# Patient Record
Sex: MALE | Race: WHITE | Employment: FULL TIME | URBAN - NONMETROPOLITAN AREA
[De-identification: names, ages, dates, MRNs, and addresses within clinical notes are randomized per-mention and may not be internally consistent; named-entity substitution may affect disease eponyms.]

---

## 2017-12-04 ENCOUNTER — OFFICE VISIT (OUTPATIENT)
Dept: PRIMARY CARE CLINIC | Age: 22
End: 2017-12-04
Payer: COMMERCIAL

## 2017-12-04 VITALS
OXYGEN SATURATION: 99 % | WEIGHT: 186 LBS | TEMPERATURE: 97.8 F | SYSTOLIC BLOOD PRESSURE: 120 MMHG | DIASTOLIC BLOOD PRESSURE: 80 MMHG | HEIGHT: 72 IN | RESPIRATION RATE: 16 BRPM | BODY MASS INDEX: 25.19 KG/M2 | HEART RATE: 64 BPM

## 2017-12-04 DIAGNOSIS — J01.00 ACUTE NON-RECURRENT MAXILLARY SINUSITIS: Primary | ICD-10-CM

## 2017-12-04 PROCEDURE — 96372 THER/PROPH/DIAG INJ SC/IM: CPT | Performed by: NURSE PRACTITIONER

## 2017-12-04 PROCEDURE — 99213 OFFICE O/P EST LOW 20 MIN: CPT | Performed by: NURSE PRACTITIONER

## 2017-12-04 RX ORDER — AZELASTINE 1 MG/ML
1 SPRAY, METERED NASAL 2 TIMES DAILY
Qty: 1 BOTTLE | Refills: 3 | Status: SHIPPED | OUTPATIENT
Start: 2017-12-04 | End: 2018-11-01

## 2017-12-04 RX ORDER — DOXYCYCLINE HYCLATE 100 MG/1
100 CAPSULE ORAL 2 TIMES DAILY
Qty: 20 CAPSULE | Refills: 0 | Status: SHIPPED | OUTPATIENT
Start: 2017-12-04 | End: 2020-04-30 | Stop reason: SDUPTHER

## 2017-12-04 RX ORDER — TRIAMCINOLONE ACETONIDE 40 MG/ML
40 INJECTION, SUSPENSION INTRA-ARTICULAR; INTRAMUSCULAR ONCE
Status: COMPLETED | OUTPATIENT
Start: 2017-12-04 | End: 2017-12-04

## 2017-12-04 RX ORDER — PREDNISONE 1 MG/1
TABLET ORAL
Qty: 18 TABLET | Refills: 0 | Status: SHIPPED | OUTPATIENT
Start: 2017-12-04 | End: 2018-11-01

## 2017-12-04 RX ADMIN — TRIAMCINOLONE ACETONIDE 40 MG: 40 INJECTION, SUSPENSION INTRA-ARTICULAR; INTRAMUSCULAR at 11:59

## 2017-12-04 ASSESSMENT — ENCOUNTER SYMPTOMS
SINUS PRESSURE: 1
NAUSEA: 1

## 2017-12-04 NOTE — PROGRESS NOTES
[address] [address] [place]  [id number]  Dept: [phone]  Dept Fax: [phone]  Loc: [phone]    Kiera Flores is a 23 y.o. male who presents today for his medical conditions/complaints as noted below. Keira Flores is c/o of Sinusitis (patient is here today because he has been experiencing nausea and vomiting, problems with his sinuses, head pressure and cough. Patient states that he has been feeling bad for about 3 weeks. )        HPI:     HPI   Chief Complaint   Patient presents with    Sinusitis     patient is here today because he has been experiencing nausea and vomiting, problems with his sinuses, head pressure and cough. Patient states that he has been feeling bad for about 3 weeks. been using otc cold meds. Fever low grade. The nausea came in the morning when he had a lot of sinus drainage. He was having problems focusing on the computer so he went and had his eyes checked and they were normal.  Vitals 12/4/2017 9/72/6994   SYSTOLIC [phone]   DIASTOLIC 80 83   Site Right Arm    Position Sitting    Cuff Size Medium Adult    Pulse 64 72   Temp 97.8 98.6   Resp 16 16   Weight 186 lb 184 lb 4.8 oz   Height 6' 0\" 6' 0\"     History reviewed. No pertinent past medical history. Past Surgical History:   Procedure Laterality Date    BONE GRAFT      BONE GRAFT      Dr Nedra Vazquez Left        History reviewed. No pertinent family history. Social History   Substance Use Topics    Smoking status: Never Smoker    Smokeless tobacco: Never Used    Alcohol use No      Current Outpatient Prescriptions   Medication Sig Dispense Refill    predniSONE (DELTASONE) 5 MG tablet Days 1-3 take 1 PO TID, days 4-6 take 1 PO BID, days 7-9 take 1 PO daily.  Start on Tue 18 tablet 0    doxycycline hyclate (VIBRAMYCIN) 100 MG capsule Take 1 capsule by mouth 2 times daily for 10 days 20 capsule 0    azelastine (ASTELIN) 0.1 % nasal spray 1 spray by Nasal route 2 times daily Use in each nostril as directed 1 Bottle 3     No current facility-administered medications for this visit. Allergies   Allergen Reactions    Cefprozil Hives       Health Maintenance   Topic Date Due    HIV screen  06/22/2008    DTaP/Tdap/Td vaccine (1 - Tdap) 06/22/2012    Flu vaccine (1) 09/01/2017       Subjective:      Review of Systems   Constitutional: Positive for fever. HENT: Positive for ear pain and sinus pressure. Gastrointestinal: Positive for nausea. Psychiatric/Behavioral: Negative. Objective:     Physical Exam   Constitutional: He is oriented to person, place, and time. He appears well-developed and well-nourished. HENT:   Head: Normocephalic. Right Ear: Tympanic membrane is not erythematous and not bulging. A middle ear effusion is present. Left Ear: Tympanic membrane is not erythematous and not bulging. A middle ear effusion is present. Nose: Right sinus exhibits maxillary sinus tenderness. Left sinus exhibits maxillary sinus tenderness. Mouth/Throat: Uvula is midline and oropharynx is clear and moist.   Cardiovascular: Normal rate, regular rhythm and normal heart sounds. Pulmonary/Chest: Effort normal and breath sounds normal.   Neurological: He is alert and oriented to person, place, and time. Skin: Skin is warm and dry. Psychiatric: He has a normal mood and affect. His behavior is normal. Judgment and thought content normal.   Nursing note and vitals reviewed. /80 (Site: Right Arm, Position: Sitting, Cuff Size: Medium Adult)   Pulse 64   Temp 97.8 °F (36.6 °C) (Temporal)   Resp 16   Ht 6' (1.829 m)   Wt 186 lb (84.4 kg)   SpO2 99%   BMI 25.23 kg/m²     Assessment:      1. Acute non-recurrent maxillary sinusitis         Plan:        Patient given educational materials - see patient instructions. Discussed use, benefit, and side effects of prescribed medications. All patient questions answered.   Pt voiced

## 2018-11-01 ENCOUNTER — OFFICE VISIT (OUTPATIENT)
Dept: PRIMARY CARE CLINIC | Age: 23
End: 2018-11-01
Payer: COMMERCIAL

## 2018-11-01 VITALS
OXYGEN SATURATION: 98 % | RESPIRATION RATE: 16 BRPM | WEIGHT: 200 LBS | HEART RATE: 72 BPM | BODY MASS INDEX: 27.09 KG/M2 | DIASTOLIC BLOOD PRESSURE: 84 MMHG | HEIGHT: 72 IN | TEMPERATURE: 97.1 F | SYSTOLIC BLOOD PRESSURE: 140 MMHG

## 2018-11-01 DIAGNOSIS — Z23 NEED FOR TDAP VACCINATION: ICD-10-CM

## 2018-11-01 DIAGNOSIS — J34.2 DEVIATED SEPTUM: Primary | ICD-10-CM

## 2018-11-01 PROCEDURE — 99213 OFFICE O/P EST LOW 20 MIN: CPT | Performed by: NURSE PRACTITIONER

## 2018-11-01 PROCEDURE — 90715 TDAP VACCINE 7 YRS/> IM: CPT | Performed by: NURSE PRACTITIONER

## 2018-11-01 PROCEDURE — 90471 IMMUNIZATION ADMIN: CPT | Performed by: NURSE PRACTITIONER

## 2018-11-01 RX ORDER — FEXOFENADINE HCL AND PSEUDOEPHEDRINE HCI 180; 240 MG/1; MG/1
1 TABLET, EXTENDED RELEASE ORAL DAILY
Qty: 30 TABLET | Refills: 3 | Status: SHIPPED | OUTPATIENT
Start: 2018-11-01 | End: 2020-04-30 | Stop reason: SDUPTHER

## 2018-11-02 ASSESSMENT — ENCOUNTER SYMPTOMS
SORE THROAT: 1
SINUS PRESSURE: 1

## 2019-06-27 ENCOUNTER — HOSPITAL ENCOUNTER (OUTPATIENT)
Dept: NON INVASIVE DIAGNOSTICS | Age: 24
Discharge: HOME | End: 2019-06-27

## 2019-06-27 LAB
LV EF: 50 %
LVEF MODALITY: NORMAL

## 2019-06-27 PROCEDURE — 93350 STRESS TTE ONLY: CPT

## 2019-09-09 ENCOUNTER — OFFICE VISIT (OUTPATIENT)
Dept: INTERNAL MEDICINE | Facility: CLINIC | Age: 24
End: 2019-09-09

## 2019-09-09 ENCOUNTER — TELEPHONE (OUTPATIENT)
Dept: PRIMARY CARE CLINIC | Age: 24
End: 2019-09-09

## 2019-09-09 VITALS
TEMPERATURE: 98.1 F | RESPIRATION RATE: 18 BRPM | BODY MASS INDEX: 26.3 KG/M2 | WEIGHT: 194.2 LBS | SYSTOLIC BLOOD PRESSURE: 121 MMHG | OXYGEN SATURATION: 100 % | HEART RATE: 90 BPM | HEIGHT: 72 IN | DIASTOLIC BLOOD PRESSURE: 76 MMHG

## 2019-09-09 DIAGNOSIS — A09 GASTROINTESTINAL INFECTION: Primary | ICD-10-CM

## 2019-09-09 PROCEDURE — 99213 OFFICE O/P EST LOW 20 MIN: CPT | Performed by: NURSE PRACTITIONER

## 2019-09-09 RX ORDER — METRONIDAZOLE 250 MG/1
250 TABLET ORAL 3 TIMES DAILY
Qty: 21 TABLET | Refills: 0 | Status: SHIPPED | OUTPATIENT
Start: 2019-09-09

## 2019-09-09 NOTE — PROGRESS NOTES
CC: diarrhea and fever    History:  Chavo Potts is a 24 y.o. male who presents today for evaluation of the above problems.      Patient reports that he just returned yesterday from [employer] in [place].  He was [place] in the mountains during this trip.  1 of the individuals with him is currently hospitalized for Giardia infection.  He reports that he was not drinking the same water as this individual, however, person did cook for him at times.  Chavo reports that he developed a fever yesterday up to 103 which has been relieved by Tylenol.  Diarrhea started by last night.  He states he has been well over 5-10 times already today.  Denies any nausea.  Denies any blood in his stool.      Diarrhea    This is a new problem. The current episode started yesterday. The problem occurs 5 to 10 times per day. The problem has been gradually worsening. Associated symptoms include abdominal pain and a fever. Pertinent negatives include no vomiting. Nothing aggravates the symptoms. Risk factors include ill contacts and suspect food intake. He has tried analgesics for the symptoms.   Fever    Associated symptoms include abdominal pain and diarrhea. Pertinent negatives include no vomiting.     ROS:  Review of Systems   Constitutional: Positive for fever.   Gastrointestinal: Positive for abdominal pain and diarrhea. Negative for vomiting.       Allergies   Allergen Reactions   • Cefprozil Hives     History reviewed. No pertinent past medical history.  Past Surgical History:   Procedure Laterality Date   • BONE GRAFT     • KNEE SURGERY       Family History   Problem Relation Age of Onset   • Cancer Father       reports that he has never smoked. He has never used smokeless tobacco. He reports that he does not drink alcohol or use drugs.      Current Outpatient Medications:   •  metroNIDAZOLE (FLAGYL) 250 MG tablet, Take 1 tablet by mouth 3 (Three) Times a Day., Disp: 21 tablet, Rfl: 0    OBJECTIVE:  /76 (BP Location: Left arm,  "Patient Position: Sitting, Cuff Size: Adult)   Pulse 90   Temp 98.1 °F (36.7 °C) (Oral)   Resp 18   Ht 182.9 cm (72\")   Wt 88.1 kg (194 lb 3.2 oz)   SpO2 100%   BMI 26.34 kg/m²    Physical Exam   Constitutional: He is oriented to person, place, and time. Vital signs are normal. He appears well-developed and well-nourished.   Cardiovascular: Normal rate and regular rhythm.   Pulmonary/Chest: Effort normal and breath sounds normal.   Abdominal: Soft. Bowel sounds are normal. He exhibits no distension. There is no tenderness.   Neurological: He is alert and oriented to person, place, and time.   Psychiatric: He has a normal mood and affect. His behavior is normal.   Vitals reviewed.      Assessment/Plan    Chavo was seen today for diarrhea and fever.    Diagnoses and all orders for this visit:    Gastrointestinal infection  -     metroNIDAZOLE (FLAGYL) 250 MG tablet; Take 1 tablet by mouth 3 (Three) Times a Day.      This patient has high risk for Giardia infection.  We will go ahead and treat with Flagyl.  Advised patient to maintain good hydration.  He should not attempt to stop diarrhea with Imodium at this time.  If he does not improve with medication he should return to clinic.    An After Visit Summary was printed and given to the patient at discharge.  Return if symptoms worsen or fail to improve.       ZARA Reich 09/09/2019    Electronically signed.  "

## 2020-04-27 ENCOUNTER — TELEPHONE (OUTPATIENT)
Dept: PRIMARY CARE CLINIC | Age: 25
End: 2020-04-27

## 2020-04-27 NOTE — TELEPHONE ENCOUNTER
PT wanted a refill on his allegra D and PT has not had an office visit since 2018. I have called the PT to schedule an appt and left a VM.

## 2020-04-30 ENCOUNTER — VIRTUAL VISIT (OUTPATIENT)
Dept: PRIMARY CARE CLINIC | Age: 25
End: 2020-04-30
Payer: COMMERCIAL

## 2020-04-30 PROCEDURE — 99213 OFFICE O/P EST LOW 20 MIN: CPT | Performed by: NURSE PRACTITIONER

## 2020-04-30 RX ORDER — AZELASTINE 1 MG/ML
1 SPRAY, METERED NASAL 2 TIMES DAILY
Qty: 1 BOTTLE | Refills: 3 | Status: SHIPPED | OUTPATIENT
Start: 2020-04-30 | End: 2022-08-31

## 2020-04-30 RX ORDER — DOXYCYCLINE HYCLATE 100 MG/1
100 CAPSULE ORAL 2 TIMES DAILY
Qty: 20 CAPSULE | Refills: 0 | Status: SHIPPED | OUTPATIENT
Start: 2020-04-30 | End: 2020-05-10

## 2020-04-30 RX ORDER — FEXOFENADINE HCL AND PSEUDOEPHEDRINE HCI 180; 240 MG/1; MG/1
1 TABLET, EXTENDED RELEASE ORAL DAILY
Qty: 90 TABLET | Refills: 1 | Status: SHIPPED | OUTPATIENT
Start: 2020-04-30

## 2020-04-30 ASSESSMENT — ENCOUNTER SYMPTOMS
SINUS PRESSURE: 1
SINUS PAIN: 1
COUGH: 0

## 2021-08-06 ENCOUNTER — OFFICE VISIT (OUTPATIENT)
Dept: PRIMARY CARE CLINIC | Age: 26
End: 2021-08-06
Payer: COMMERCIAL

## 2021-08-06 VITALS
SYSTOLIC BLOOD PRESSURE: 130 MMHG | HEART RATE: 85 BPM | WEIGHT: 215.8 LBS | BODY MASS INDEX: 29.23 KG/M2 | HEIGHT: 72 IN | DIASTOLIC BLOOD PRESSURE: 82 MMHG | OXYGEN SATURATION: 97 % | TEMPERATURE: 98.9 F | RESPIRATION RATE: 16 BRPM

## 2021-08-06 DIAGNOSIS — R05.9 COUGH: Primary | ICD-10-CM

## 2021-08-06 DIAGNOSIS — R50.9 FEVER, UNSPECIFIED FEVER CAUSE: ICD-10-CM

## 2021-08-06 DIAGNOSIS — H66.90 ACUTE OTITIS MEDIA, UNSPECIFIED OTITIS MEDIA TYPE: ICD-10-CM

## 2021-08-06 LAB
INFLUENZA A ANTIBODY: NEGATIVE
INFLUENZA B ANTIBODY: NEGATIVE
SARS-COV-2, PCR: NOT DETECTED

## 2021-08-06 PROCEDURE — 99214 OFFICE O/P EST MOD 30 MIN: CPT | Performed by: NURSE PRACTITIONER

## 2021-08-06 PROCEDURE — 87804 INFLUENZA ASSAY W/OPTIC: CPT | Performed by: NURSE PRACTITIONER

## 2021-08-06 PROCEDURE — 96372 THER/PROPH/DIAG INJ SC/IM: CPT | Performed by: NURSE PRACTITIONER

## 2021-08-06 RX ORDER — DEXAMETHASONE 6 MG/1
6 TABLET ORAL 2 TIMES DAILY WITH MEALS
Qty: 20 TABLET | Refills: 0 | Status: SHIPPED | OUTPATIENT
Start: 2021-08-06 | End: 2021-08-09 | Stop reason: SINTOL

## 2021-08-06 RX ORDER — BETAMETHASONE SODIUM PHOSPHATE AND BETAMETHASONE ACETATE 3; 3 MG/ML; MG/ML
12 INJECTION, SUSPENSION INTRA-ARTICULAR; INTRALESIONAL; INTRAMUSCULAR; SOFT TISSUE ONCE
Status: COMPLETED | OUTPATIENT
Start: 2021-08-06 | End: 2021-08-06

## 2021-08-06 RX ORDER — FLUTICASONE PROPIONATE 50 MCG
2 SPRAY, SUSPENSION (ML) NASAL DAILY
Qty: 1 BOTTLE | Refills: 0 | Status: SHIPPED | OUTPATIENT
Start: 2021-08-06

## 2021-08-06 RX ORDER — AMOXICILLIN 500 MG/1
500 CAPSULE ORAL 3 TIMES DAILY
Qty: 21 CAPSULE | Refills: 0 | Status: SHIPPED | OUTPATIENT
Start: 2021-08-06 | End: 2021-08-13

## 2021-08-06 RX ORDER — DEXTROMETHORPHAN HYDROBROMIDE AND PROMETHAZINE HYDROCHLORIDE 15; 6.25 MG/5ML; MG/5ML
SYRUP ORAL
Qty: 120 ML | Refills: 0 | Status: SHIPPED | OUTPATIENT
Start: 2021-08-06 | End: 2021-08-13

## 2021-08-06 RX ADMIN — BETAMETHASONE SODIUM PHOSPHATE AND BETAMETHASONE ACETATE 12 MG: 3; 3 INJECTION, SUSPENSION INTRA-ARTICULAR; INTRALESIONAL; INTRAMUSCULAR; SOFT TISSUE at 14:40

## 2021-08-06 ASSESSMENT — ENCOUNTER SYMPTOMS
COUGH: 1
GASTROINTESTINAL NEGATIVE: 1
ALLERGIC/IMMUNOLOGIC NEGATIVE: 1
EYES NEGATIVE: 1

## 2021-08-06 ASSESSMENT — PATIENT HEALTH QUESTIONNAIRE - PHQ9
2. FEELING DOWN, DEPRESSED OR HOPELESS: 0
SUM OF ALL RESPONSES TO PHQ QUESTIONS 1-9: 0
SUM OF ALL RESPONSES TO PHQ QUESTIONS 1-9: 0
1. LITTLE INTEREST OR PLEASURE IN DOING THINGS: 0
SUM OF ALL RESPONSES TO PHQ9 QUESTIONS 1 & 2: 0
SUM OF ALL RESPONSES TO PHQ QUESTIONS 1-9: 0

## 2021-08-06 NOTE — PROGRESS NOTES
Formerly [facility] PHYSICIAN SERVICES  [facility]  [address]  [address] [address]  Dept: [phone]  Dept Fax: [phone]  Loc: [phone]    Mikayla Gunderson is a 27 y.o. male who presents today for his medical conditions/complaints as noted below. Mikayla Gunderson is c/o of Fever (started Wednesday, had covid test this first test was inconclusive and second was negative), Congestion (can smell but no taste), and Cough (non productive, throat hurts only at night)        HPI:     HPI     This 78-year-old male presents today for fever, congestion and cough. States that he had a \"Covid test which was negative. Does report a change in taste smell. Chief Complaint   Patient presents with    Fever     started Wednesday, had covid test this first test was inconclusive and second was negative    Congestion     can smell but no taste    Cough     non productive, throat hurts only at night     No past medical history on file.    Past Surgical History:   Procedure Laterality Date    BONE GRAFT      BONE GRAFT      Dr Ramesh Larose 8/6/2021 11/1/2018 12/4/2017 0/00/4459   SYSTOLIC [phone]   DIASTOLIC 82 84 80 83   Site - Left Upper Arm Right Arm -   Position - Sitting Sitting -   Cuff Size - Medium Adult Medium Adult -   Pulse 85 72 64 72   Temp 98.9 97.1 97.8 98.6   Resp 16 16 16 16   SpO2 97 98 99 -   Weight 215 lb 12.8 oz 200 lb 186 lb 184 lb 4.8 oz   Height 6' 0\" 6' 0\" 6' 0\" 6' 0\"   Body mass index 29.26 kg/m2 27.12 kg/m2 25.22 kg/m2 24.99 kg/m2       Family History   Problem Relation Age of Onset    Cancer Father         bladder       Social History     Tobacco Use    Smoking status: Never Smoker    Smokeless tobacco: Never Used   Substance Use Topics    Alcohol use: No      Current Outpatient Medications on File Prior to Visit   Medication Sig Dispense Refill    fexofenadine-pseudoephedrine (ALLEGRA-D ALLERGY & CONGESTION) 180-240 MG per extended release tablet Take 1 tablet by mouth daily 90 tablet 1    azelastine (ASTELIN) 0.1 % nasal spray 1 spray by Nasal route 2 times daily Use in each nostril as directed (Patient not taking: Reported on 8/6/2021) 1 Bottle 3     No current facility-administered medications on file prior to visit. Allergies   Allergen Reactions    Cefprozil Hives       Health Maintenance   Topic Date Due    Hepatitis C screen  Never done    Varicella vaccine (1 of 2 - 2-dose childhood series) Never done    HIV screen  Never done    COVID-19 Vaccine (2 - Pfizer 2-dose series) 08/09/2021    Flu vaccine (1) 09/01/2021    DTaP/Tdap/Td vaccine (2 - Td or Tdap) 11/01/2028    Hepatitis A vaccine  Aged Out    Hepatitis B vaccine  Aged Out    Hib vaccine  Aged Out    Meningococcal (ACWY) vaccine  Aged Out    Pneumococcal 0-64 years Vaccine  Aged Out       Subjective:      Review of Systems   Constitutional: Positive for fever. Negative for unexpected weight change. HENT: Positive for congestion. Change in taste and smell   Eyes: Negative. Respiratory: Positive for cough. Cardiovascular: Negative. Gastrointestinal: Negative. Endocrine: Negative. Genitourinary: Negative. Musculoskeletal: Negative. Skin: Negative. Negative for rash. Allergic/Immunologic: Negative. Neurological: Negative. Hematological: Negative. Psychiatric/Behavioral: Negative. Objective:     Physical Exam  Vitals and nursing note reviewed. Constitutional:       General: He is not in acute distress. Appearance: Normal appearance. He is not ill-appearing or toxic-appearing. HENT:      Head: Normocephalic and atraumatic. Right Ear: Tympanic membrane is bulging. Tympanic membrane is not erythematous. Left Ear: Swelling and tenderness present. A middle ear effusion is present. Tympanic membrane is erythematous and bulging. Nose: Congestion and rhinorrhea present.       Mouth/Throat:      Mouth: Mucous membranes are moist.      Pharynx: Posterior oropharyngeal erythema present. Eyes:      Extraocular Movements: Extraocular movements intact. Pupils: Pupils are equal, round, and reactive to light. Cardiovascular:      Rate and Rhythm: Normal rate and regular rhythm. Pulses: Normal pulses. Heart sounds: Normal heart sounds. Pulmonary:      Effort: Pulmonary effort is normal.      Breath sounds: Normal breath sounds. Abdominal:      General: Bowel sounds are normal.      Palpations: Abdomen is soft. Musculoskeletal:         General: Normal range of motion. Cervical back: Normal range of motion. No rigidity. Lymphadenopathy:      Cervical: No cervical adenopathy. Skin:     General: Skin is warm and dry. Neurological:      Mental Status: He is alert and oriented to person, place, and time. Mental status is at baseline. Psychiatric:         Mood and Affect: Mood normal.         Behavior: Behavior normal.       /82   Pulse 85   Temp 98.9 °F (37.2 °C) (Temporal)   Resp 16   Ht 6' (1.829 m)   Wt 215 lb 12.8 oz (97.9 kg)   SpO2 97%   BMI 29.27 kg/m²     Assessment:       Diagnosis Orders   1. Cough  COVID-19    POCT Influenza A/B   2. Fever, unspecified fever cause  COVID-19    POCT Influenza A/B   3. Acute otitis media, unspecified otitis media type           Plan:   Covid testing in office today. Please self quarantine at home until notified of results. POCT influenza into the office today results reviewed negative in office. Viral syndrome   Many illnesses are caused by viruses. These conditions usually run their course in 7-14 days. Antibiotics do not help fight viral infections and are not needed at this time. Viral syndromes are treated with symptomatic support. You may take tylenol or ibuprofen for fever or aches and pains. Stay hydrated by taking sips of water or non caffeinated, noncarbonated, and nonalcoholic beverages throughout the day.  For sore throat, you may gargle with warm salt water, use lozenges or sprays. Using a daily antihistamine such as Claritin, Zyrtec or Allegra can help with upper respiratory symptoms. Benadryl can be sedating but is helpful at drying secretions and may be taken at night. Call if you have a fever greater than 102 F or if symptoms do not improve. Your provider may also send you in prescription medications depending on your symptoms and their severity. Take all medications as directed on package unless specifically told otherwise. Covid Supportive Treatments    Zinc 250 mg daily for 5 days and then decrease to 50 mg a day. Vitamin C 1000 mg a day. Vitamin D 5911-9620 mcg a day. Aspirin 325 mg a day. Daily antihistamine of choice ( Claritin, Allegra, or Zyrtec)  Pepcid daily. Tylenol for aches, pain and fever. Your provider may also send in prescriptions for symptom specific treatment. Take amoxicillin as directed until complete. Antibiotic Therapy  Take your antibiotic as prescribed. Take all of your antibiotics. You should not have any left over. Many antibiotics may cause diarrhea. . you can start an OTC probiotic to support normal gut bacteria. If you are on birth control, you need to use an alternative method of contraceptive for one month as antibiotics can reduce the effectiveness of oral BC. Always monitor for signs of allergic reaction such as itching, hives or any difficulty swallowing or breathing STOP THE MEDICATION IMMEDIATELY. If difficulty breathing, call 911 and go to the ER. If hives and itching, contact provider through [address] or phone for alternative antibiotics or be seen if necessary. Patient given educational materials -see patient instructions. Discussed use, benefit, and side effects of prescribed medications. All patient questions answered. Pt voiced understanding. Reviewed health maintenance. Instructed to continue currentmedications, diet and exercise.   Patient agreed with treatment plan. Follow up as directed. MEDICATIONS:  Orders Placed This Encounter   Medications    promethazine-dextromethorphan (PROMETHAZINE-DM) 6.25-15 MG/5ML syrup     Si to 2 teaspoons every 4 hours at night for cough. Dispense:  120 mL     Refill:  0    amoxicillin (AMOXIL) 500 MG capsule     Sig: Take 1 capsule by mouth 3 times daily for 7 days     Dispense:  21 capsule     Refill:  0    betamethasone acetate-betamethasone sodium phosphate (CELESTONE) injection 12 mg    dexamethasone (DECADRON) 6 MG tablet     Sig: Take 1 tablet by mouth 2 times daily (with meals) for 10 days     Dispense:  20 tablet     Refill:  0    fluticasone (FLONASE) 50 MCG/ACT nasal spray     Si sprays by Each Nostril route daily     Dispense:  1 Bottle     Refill:  0         ORDERS:  Orders Placed This Encounter   Procedures    COVID-19    COVID-19    COVID-19    POCT Influenza A/B       Follow-up:  Return if symptoms worsen or fail to improve. PATIENT INSTRUCTIONS:  Patient Instructions     Viral syndrome   Many illnesses are caused by viruses. These conditions usually run their course in 7-14 days. Antibiotics do not help fight viral infections and are not needed at this time. Viral syndromes are treated with symptomatic support. You may take tylenol or ibuprofen for fever or aches and pains. Stay hydrated by taking sips of water or non caffeinated, noncarbonated, and nonalcoholic beverages throughout the day. For sore throat, you may gargle with warm salt water, use lozenges or sprays. Using a daily antihistamine such as Claritin, Zyrtec or Allegra can help with upper respiratory symptoms. Benadryl can be sedating but is helpful at drying secretions and may be taken at night. Call if you have a fever greater than 102 F or if symptoms do not improve. Your provider may also send you in prescription medications depending on your symptoms and their severity.  Take all medications as directed on package unless specifically told otherwise. Patient Education        Coronavirus ([id number]): Care Instructions  Overview  The coronavirus disease (COVID-19) is caused by a virus. Symptoms may include a fever, a cough, and shortness of breath. It mainly spreads person-to-person through droplets from coughing and sneezing. The virus also can spread when people are in close contact with someone who is infected. Most people have mild symptoms and can take care of themselves at home. If their symptoms get worse, they may need care in a hospital. Treatment may include medicines to reduce symptoms, plus breathing support such as oxygen therapy or a ventilator. It's important to not spread the virus to others. If you have COVID-19, wear a face cover anytime you are around other people. It can help stop the spread of the virus. You need to isolate yourself while you are sick. Leave your home only if you need to get medical care or testing. Follow-up care is a key part of your treatment and safety. Be sure to make and go to all appointments, and call your doctor if you are having problems. It's also a good idea to know your test results and keep a list of the medicines you take. How can you care for yourself at home? · Get extra rest. It can help you feel better. · Drink plenty of fluids. This helps replace fluids lost from fever. Fluids also help ease a scratchy throat. Water, soup, fruit juice, and hot tea with lemon are good choices. · Take acetaminophen (such as Tylenol) to reduce a fever. It may also help with muscle aches. Read and follow all instructions on the label. · Use petroleum jelly on sore skin. This can help if the skin around your nose and lips becomes sore from rubbing a lot with tissues. If you use oxygen, use a water-based product instead of petroleum jelly. Tips for self-isolation  · Limit contact with people in your home. If possible, stay in a separate bedroom and use a separate bathroom.   · Wear a cloth face cover when you are around other people. It can help stop the spread of the virus when you cough or sneeze. · If you have to leave home, avoid crowds and try to stay at least 6 feet away from other people. · Avoid contact with pets and other animals. · Cover your mouth and nose with a tissue when you cough or sneeze. Then throw it in the trash right away. · Wash your hands often, especially after you cough or sneeze. Use soap and water, and scrub for at least 20 seconds. If soap and water aren't available, use an alcohol-based hand . · Don't share personal household items. These include bedding, towels, cups and glasses, and eating utensils. · [address] in the warmest water allowed for the fabric type, and dry it completely. It's okay to wash other people's laundry with yours. · Clean and disinfect your home every day. Use household  and disinfectant wipes or sprays. Take special care to clean things that you grab with your hands. These include doorknobs, remote controls, phones, and handles on your refrigerator and microwave. And don't forget countertops, tabletops, bathrooms, and computer keyboards. When you can end self-isolation  · If you know or suspect that you have COVID-19, stay in self-isolation until:  ? You haven't had a fever for 24 hours while not taking medicines to lower the fever, and  ? Your symptoms have improved, and  ? It's been at least 10 days since your symptoms started. · Talk to your doctor about whether you also need testing, especially if you have a weakened immune system. When should you call for help? Call 911 anytime you think you may need emergency care. For example, call if you have life-threatening symptoms, such as:    · You have severe trouble breathing.  (You can't talk at all.)     · You have constant chest pain or pressure.     · You are severely dizzy or lightheaded.     · You are confused or can't think clearly.     · Your face and lips have a blue color.     · You pass out (lose consciousness) or are very hard to wake up. Call your doctor now or seek immediate medical care if:    · You have moderate trouble breathing. (You can't speak a full sentence.)     · You are coughing up blood (more than about 1 teaspoon).     · You have signs of low blood pressure. These include feeling lightheaded; being too weak to stand; and having cold, pale, clammy skin. Watch closely for changes in your health, and be sure to contact your doctor if:    · Your symptoms get worse.     · You are not getting better as expected. Call before you go to the doctor's office. Follow their instructions. And wear a cloth face cover. Current as of: March 26, 2021               Content Version: 12.9  © 2006-2021 [employer]. Care instructions adapted under license by [facility] ([facility]). If you have questions about a medical condition or this instruction, always ask your healthcare professional. [place] [address] any warranty or liability for your use of this information. Patient Education        10 Things to Do When You Have COVID-19    Stay home. Don't go to school, work, or public areas. And don't use public transportation, ride-shares, or taxis unless you have no choice. Leave your home only if you need to get medical care. But call the doctor's office first so they know you're coming. And wear a cloth face cover. Ask before leaving isolation. Talk with your doctor or other health professional about when it will be safe for you to leave isolation. Wear a cloth face cover when you are around other people. It can help stop the spread of the virus when you cough or sneeze. Limit contact with people in your home. If possible, stay in a separate bedroom and use a separate bathroom. Avoid contact with pets and other animals. If possible, have a friend or family member care for them while you're sick.      Cover your mouth and nose with a tissue when you cough or sneeze. Then throw the tissue in the trash right away. Wash your hands often, especially after you cough or sneeze. Use soap and water, and scrub for at least 20 seconds. If soap and water aren't available, use an alcohol-based hand . Don't share personal household items. These include bedding, towels, cups and glasses, and eating utensils. Clean and disinfect your home every day. Use household  or disinfectant wipes or sprays. Take special care to clean things that you grab with your hands. These include doorknobs, remote controls, phones, and handles on your refrigerator and microwave. And don't forget countertops, tabletops, bathrooms, and computer keyboards. Take acetaminophen (Tylenol) to relieve fever and body aches. Read and follow all instructions on the label. Current as of: March 26, 2021               Content Version: 12.9  © 2006-2021 [employer], [employer]. Care instructions adapted under license by [facility] ([facility]). If you have questions about a medical condition or this instruction, always ask your healthcare professional. [place] [address] any warranty or liability for your use of this information. Antibiotic Therapy  Take your antibiotic as prescribed. Take all of your antibiotics. You should not have any left over. Many antibiotics may cause diarrhea. . you can start an OTC probiotic to support normal gut bacteria. If you are on birth control, you need to use an alternative method of contraceptive for one month as antibiotics can reduce the effectiveness of oral BC. Always monitor for signs of allergic reaction such as itching, hives or any difficulty swallowing or breathing STOP THE MEDICATION IMMEDIATELY. If difficulty breathing, call 911 and go to the ER. If hives and itching, contact provider through [address] or phone for alternative antibiotics or be seen if necessary.        Covid Supportive Treatments    Zinc 250 mg daily for 5 days and then decrease to 50 mg a day. Vitamin C 1000 mg a day. Vitamin D 6267-1627 mcg a day. Aspirin 325 mg a day. Daily antihistamine of choice ( Claritin, Allegra, or Zyrtec)  Pepcid daily. Tylenol for aches, pain and fever. Your provider may also send in prescriptions for symptom specific treatment. Electronically signed by BRUCE Dooley NP on 8/6/2021 at 5:27 PM    EMR Dragon/transcription disclaimer:  Much of thisencounter note is electronic transcription/translation of spoken language to printed texts. The electronic translation of spoken language may be erroneous, or at times, nonsensical words or phrases may be inadvertentlytranscribed.   Although I have reviewed the note for such errors, some may still exist.

## 2021-08-06 NOTE — PATIENT INSTRUCTIONS
Viral syndrome   Many illnesses are caused by viruses. These conditions usually run their course in 7-14 days. Antibiotics do not help fight viral infections and are not needed at this time. Viral syndromes are treated with symptomatic support. You may take tylenol or ibuprofen for fever or aches and pains. Stay hydrated by taking sips of water or non caffeinated, noncarbonated, and nonalcoholic beverages throughout the day. For sore throat, you may gargle with warm salt water, use lozenges or sprays. Using a daily antihistamine such as Claritin, Zyrtec or Allegra can help with upper respiratory symptoms. Benadryl can be sedating but is helpful at drying secretions and may be taken at night. Call if you have a fever greater than 102 F or if symptoms do not improve. Your provider may also send you in prescription medications depending on your symptoms and their severity. Take all medications as directed on package unless specifically told otherwise. Patient Education        Coronavirus ([id number]-61): Care Instructions  Overview  The coronavirus disease (COVID-19) is caused by a virus. Symptoms may include a fever, a cough, and shortness of breath. It mainly spreads person-to-person through droplets from coughing and sneezing. The virus also can spread when people are in close contact with someone who is infected. Most people have mild symptoms and can take care of themselves at home. If their symptoms get worse, they may need care in a hospital. Treatment may include medicines to reduce symptoms, plus breathing support such as oxygen therapy or a ventilator. It's important to not spread the virus to others. If you have COVID-19, wear a face cover anytime you are around other people. It can help stop the spread of the virus. You need to isolate yourself while you are sick. Leave your home only if you need to get medical care or testing. Follow-up care is a key part of your treatment and safety.  Be sure to make and go to all appointments, and call your doctor if you are having problems. It's also a good idea to know your test results and keep a list of the medicines you take. How can you care for yourself at home? · Get extra rest. It can help you feel better. · Drink plenty of fluids. This helps replace fluids lost from fever. Fluids also help ease a scratchy throat. Water, soup, fruit juice, and hot tea with lemon are good choices. · Take acetaminophen (such as Tylenol) to reduce a fever. It may also help with muscle aches. Read and follow all instructions on the label. · Use petroleum jelly on sore skin. This can help if the skin around your nose and lips becomes sore from rubbing a lot with tissues. If you use oxygen, use a water-based product instead of petroleum jelly. Tips for self-isolation  · Limit contact with people in your home. If possible, stay in a separate bedroom and use a separate bathroom. · Wear a cloth face cover when you are around other people. It can help stop the spread of the virus when you cough or sneeze. · If you have to leave home, avoid crowds and try to stay at least 6 feet away from other people. · Avoid contact with pets and other animals. · Cover your mouth and nose with a tissue when you cough or sneeze. Then throw it in the trash right away. · Wash your hands often, especially after you cough or sneeze. Use soap and water, and scrub for at least 20 seconds. If soap and water aren't available, use an alcohol-based hand . · Don't share personal household items. These include bedding, towels, cups and glasses, and eating utensils. · [address] in the warmest water allowed for the fabric type, and dry it completely. It's okay to wash other people's laundry with yours. · Clean and disinfect your home every day. Use household  and disinfectant wipes or sprays. Take special care to clean things that you grab with your hands.  These include doorknobs, remote controls, phones, and handles on your refrigerator and microwave. And don't forget countertops, tabletops, bathrooms, and computer keyboards. When you can end self-isolation  · If you know or suspect that you have COVID-19, stay in self-isolation until:  ? You haven't had a fever for 24 hours while not taking medicines to lower the fever, and  ? Your symptoms have improved, and  ? It's been at least 10 days since your symptoms started. · Talk to your doctor about whether you also need testing, especially if you have a weakened immune system. When should you call for help? Call 911 anytime you think you may need emergency care. For example, call if you have life-threatening symptoms, such as:    · You have severe trouble breathing. (You can't talk at all.)     · You have constant chest pain or pressure.     · You are severely dizzy or lightheaded.     · You are confused or can't think clearly.     · Your face and lips have a blue color.     · You pass out (lose consciousness) or are very hard to wake up. Call your doctor now or seek immediate medical care if:    · You have moderate trouble breathing. (You can't speak a full sentence.)     · You are coughing up blood (more than about 1 teaspoon).     · You have signs of low blood pressure. These include feeling lightheaded; being too weak to stand; and having cold, pale, clammy skin. Watch closely for changes in your health, and be sure to contact your doctor if:    · Your symptoms get worse.     · You are not getting better as expected. Call before you go to the doctor's office. Follow their instructions. And wear a cloth face cover. Current as of: March 26, 2021               Content Version: 12.9  © 2006-2021 Healthwise, Inc[employer]. Care instructions adapted under license by [facility] ([facility]).  If you have questions about a medical condition or this instruction, always ask your healthcare professional. Jailene [employer] disclaims any warranty or liability for your use of this information. Patient Education        10 Things to Do When You Have COVID-19    Stay home. Don't go to school, work, or public areas. And don't use public transportation, ride-shares, or taxis unless you have no choice. Leave your home only if you need to get medical care. But call the doctor's office first so they know you're coming. And wear a cloth face cover. Ask before leaving isolation. Talk with your doctor or other health professional about when it will be safe for you to leave isolation. Wear a cloth face cover when you are around other people. It can help stop the spread of the virus when you cough or sneeze. Limit contact with people in your home. If possible, stay in a separate bedroom and use a separate bathroom. Avoid contact with pets and other animals. If possible, have a friend or family member care for them while you're sick. Cover your mouth and nose with a tissue when you cough or sneeze. Then throw the tissue in the trash right away. Wash your hands often, especially after you cough or sneeze. Use soap and water, and scrub for at least 20 seconds. If soap and water aren't available, use an alcohol-based hand . Don't share personal household items. These include bedding, towels, cups and glasses, and eating utensils. Clean and disinfect your home every day. Use household  or disinfectant wipes or sprays. Take special care to clean things that you grab with your hands. These include doorknobs, remote controls, phones, and handles on your refrigerator and microwave. And don't forget countertops, tabletops, bathrooms, and computer keyboards. Take acetaminophen (Tylenol) to relieve fever and body aches. Read and follow all instructions on the label. Current as of: March 26, 2021               Content Version: 12.9  © 2006-2021 Healthwise, [employer].    Care instructions adapted under license by [facility] ([facility]). If you have questions about a medical condition or this instruction, always ask your healthcare professional. [place] [address] any warranty or liability for your use of this information. Antibiotic Therapy  Take your antibiotic as prescribed. Take all of your antibiotics. You should not have any left over. Many antibiotics may cause diarrhea. . you can start an OTC probiotic to support normal gut bacteria. If you are on birth control, you need to use an alternative method of contraceptive for one month as antibiotics can reduce the effectiveness of oral BC. Always monitor for signs of allergic reaction such as itching, hives or any difficulty swallowing or breathing STOP THE MEDICATION IMMEDIATELY. If difficulty breathing, call 911 and go to the ER. If hives and itching, contact provider through [address] or phone for alternative antibiotics or be seen if necessary. Covid Supportive Treatments    Zinc 250 mg daily for 5 days and then decrease to 50 mg a day. Vitamin C 1000 mg a day. Vitamin D 7318-2272 mcg a day. Aspirin 325 mg a day. Daily antihistamine of choice ( Claritin, Allegra, or Zyrtec)  Pepcid daily. Tylenol for aches, pain and fever. Your provider may also send in prescriptions for symptom specific treatment.

## 2021-08-09 RX ORDER — PREDNISONE 10 MG/1
TABLET ORAL
Qty: 18 TABLET | Refills: 0 | Status: SHIPPED | OUTPATIENT
Start: 2021-08-09 | End: 2022-08-31

## 2022-08-31 ENCOUNTER — OFFICE VISIT (OUTPATIENT)
Dept: PRIMARY CARE CLINIC | Age: 27
End: 2022-08-31
Payer: COMMERCIAL

## 2022-08-31 VITALS
HEIGHT: 72 IN | HEART RATE: 66 BPM | DIASTOLIC BLOOD PRESSURE: 76 MMHG | BODY MASS INDEX: 30.56 KG/M2 | SYSTOLIC BLOOD PRESSURE: 137 MMHG | RESPIRATION RATE: 18 BRPM | WEIGHT: 225.6 LBS | OXYGEN SATURATION: 97 % | TEMPERATURE: 97 F

## 2022-08-31 DIAGNOSIS — T14.8XXA PUNCTURE WOUND: Primary | ICD-10-CM

## 2022-08-31 DIAGNOSIS — T14.8XXA HEMATOMA: ICD-10-CM

## 2022-08-31 PROCEDURE — 99213 OFFICE O/P EST LOW 20 MIN: CPT | Performed by: FAMILY MEDICINE

## 2022-08-31 RX ORDER — SULFAMETHOXAZOLE AND TRIMETHOPRIM 800; 160 MG/1; MG/1
1 TABLET ORAL 2 TIMES DAILY
Qty: 14 TABLET | Refills: 0 | Status: SHIPPED | OUTPATIENT
Start: 2022-08-31 | End: 2022-09-07

## 2022-08-31 SDOH — ECONOMIC STABILITY: FOOD INSECURITY: WITHIN THE PAST 12 MONTHS, YOU WORRIED THAT YOUR FOOD WOULD RUN OUT BEFORE YOU GOT MONEY TO BUY MORE.: NEVER TRUE

## 2022-08-31 SDOH — ECONOMIC STABILITY: FOOD INSECURITY: WITHIN THE PAST 12 MONTHS, THE FOOD YOU BOUGHT JUST DIDN'T LAST AND YOU DIDN'T HAVE MONEY TO GET MORE.: NEVER TRUE

## 2022-08-31 ASSESSMENT — PATIENT HEALTH QUESTIONNAIRE - PHQ9
SUM OF ALL RESPONSES TO PHQ QUESTIONS 1-9: 0
1. LITTLE INTEREST OR PLEASURE IN DOING THINGS: 0
SUM OF ALL RESPONSES TO PHQ9 QUESTIONS 1 & 2: 0
2. FEELING DOWN, DEPRESSED OR HOPELESS: 0
SUM OF ALL RESPONSES TO PHQ QUESTIONS 1-9: 0

## 2022-08-31 ASSESSMENT — SOCIAL DETERMINANTS OF HEALTH (SDOH): HOW HARD IS IT FOR YOU TO PAY FOR THE VERY BASICS LIKE FOOD, HOUSING, MEDICAL CARE, AND HEATING?: NOT HARD AT ALL

## 2022-08-31 ASSESSMENT — ENCOUNTER SYMPTOMS: RESPIRATORY NEGATIVE: 1

## 2022-08-31 NOTE — PROGRESS NOTES
SUBJECTIVE:    Angelina Monroe is 32 y.o.male who comes in complaining of Arm Injury (Thorn in left arm since Monday )   . HPI: Sekou Urena comes in today complaining of a locust thorn which jabbed him in the radial artery on Monday. He was out cutting a branch and the branch flew back. It hit his arm but he did not realize what happened until he saw pulsing blood. The forearm was in the left wrist.  We did not have any appointments so he went to [facility] urgent care. He sat there for an hour and then they told him there was not anything that they could do. He then went up and saw his sister who works for [employer] and she told him that she thought it would be okay but to follow-up with me. He had pulled the thorn out and it was probably 2 inches long. His arm is not as swollen as it was but it still a little uncomfortable especially on his forearm. He is afraid there is still something inside the puncture wound because it is tender. He denies any fever or chills. It has not been bleeding. Allergies   Allergen Reactions    Cefprozil Hives       Social History     Socioeconomic History    Marital status:      Spouse name: None    Number of children: None    Years of education: None    Highest education level: None   Tobacco Use    Smoking status: Never    Smokeless tobacco: Never   Vaping Use    Vaping Use: Never used   Substance and Sexual Activity    Alcohol use: No    Drug use: No    Sexual activity: Yes     Partners: Female     Social Determinants of Health     Financial Resource Strain: Low Risk     Difficulty of Paying Living Expenses: Not hard at all   Food Insecurity: No Food Insecurity    Worried About Running Out of Food in the Last Year: Never true    Ran Out of Food in the Last Year: Never true       Review of Systems   Constitutional: Negative. Respiratory: Negative. Cardiovascular: Negative. Musculoskeletal: Negative. Neurological:  Negative for light-headedness. Hematological: Negative. Psychiatric/Behavioral: Negative. Current Outpatient Medications on File Prior to Visit   Medication Sig Dispense Refill    fluticasone (FLONASE) 50 MCG/ACT nasal spray 2 sprays by Each Nostril route daily 1 Bottle 0    fexofenadine-pseudoephedrine (ALLEGRA-D ALLERGY & CONGESTION) 180-240 MG per extended release tablet Take 1 tablet by mouth daily 90 tablet 1     No current facility-administered medications on file prior to visit. OBJECTIVE:    Wt Readings from Last 3 Encounters:   08/31/22 225 lb 9.6 oz (102.3 kg)   08/06/21 215 lb 12.8 oz (97.9 kg)   11/01/18 200 lb (90.7 kg)       /76   Pulse 66   Temp 97 °F (36.1 °C)   Resp 18   Ht 6' (1.829 m)   Wt 225 lb 9.6 oz (102.3 kg)   SpO2 97%   BMI 30.60 kg/m²     Physical Exam  Vitals and nursing note reviewed. Constitutional:       Appearance: Normal appearance. HENT:      Head: Normocephalic. Musculoskeletal:         General: Tenderness present. Normal range of motion. Arms:    Skin:     General: Skin is warm and dry. Neurological:      Mental Status: He is alert. Psychiatric:         Mood and Affect: Mood normal.         Behavior: Behavior normal.         Thought Content: Thought content normal.         Judgment: Judgment normal.       ASSESSMENT:    1. Puncture wound    2. Hematoma          PLAN:  1. Puncture wound  2. Hematoma   Under magnification, it looks like there is still a little bit of the thorn close to the surface under the scab. The area was cleansed with alcohol and then using sharp tweezers I was able to remove 2 tiny parts of the thorn. Bleeding did not start. I feel that he had removed most of the thorn at the time of the injury. Bacitracin and a Band-Aid were applied. Patient was told to apply moist heat to the forearm is much as he could to help with the hematoma which is slowly improving. He was started on Bactrim DS 1 twice a day for 7 days.   I would have used Keflex but he had hives as a child to cefprozil. If he notices any problems or his arm becomes more tender red or hot he is to call. Follow-up:  Return if symptoms worsen or fail to improve. PATIENT INSTRUCTIONS:  Patient Instructions   We are committed to providing you with the best care possible. In order to help us achieve these goals please remember to bring all medications, herbal products, and over the counter supplements with you to each visit. If your provider has ordered testing for you, please be sure to follow up with our office if you have not received results within 7 days after the testing took place. *If you receive a survey after visiting one of our offices, please take time to share your experience concerning your physician office visit. These surveys are confidential and no health information about you is shared. We are eager to improve for you and we are counting on your feedback to help make that happen. EMR Dragon/transcription disclaimer:  Much of this encounter note is electronic transcription/translation of spoken language to printed texts. The electronic translation of spoken language may be erroneous, or at times, nonsensical words or phrases may beinadvertently transcribed.   Although I have reviewed the note for such errors, some may still exist.

## 2022-11-29 ENCOUNTER — OFFICE VISIT (OUTPATIENT)
Dept: PRIMARY CARE CLINIC | Age: 27
End: 2022-11-29
Payer: COMMERCIAL

## 2022-11-29 VITALS
OXYGEN SATURATION: 97 % | RESPIRATION RATE: 16 BRPM | HEART RATE: 97 BPM | SYSTOLIC BLOOD PRESSURE: 110 MMHG | BODY MASS INDEX: 28.99 KG/M2 | HEIGHT: 72 IN | TEMPERATURE: 98.5 F | WEIGHT: 214 LBS | DIASTOLIC BLOOD PRESSURE: 80 MMHG

## 2022-11-29 DIAGNOSIS — R50.9 FEVER, UNSPECIFIED FEVER CAUSE: ICD-10-CM

## 2022-11-29 DIAGNOSIS — J10.1 INFLUENZA A: Primary | ICD-10-CM

## 2022-11-29 LAB
INFLUENZA A ANTIBODY: POSITIVE
INFLUENZA B ANTIBODY: ABNORMAL

## 2022-11-29 PROCEDURE — 99213 OFFICE O/P EST LOW 20 MIN: CPT | Performed by: NURSE PRACTITIONER

## 2022-11-29 PROCEDURE — 87804 INFLUENZA ASSAY W/OPTIC: CPT | Performed by: NURSE PRACTITIONER

## 2022-11-29 PROCEDURE — 96372 THER/PROPH/DIAG INJ SC/IM: CPT | Performed by: NURSE PRACTITIONER

## 2022-11-29 RX ORDER — METHYLPREDNISOLONE 4 MG/1
TABLET ORAL
Qty: 1 KIT | Refills: 0 | Status: SHIPPED | OUTPATIENT
Start: 2022-11-29 | End: 2022-12-05

## 2022-11-29 RX ORDER — DOXYCYCLINE HYCLATE 100 MG/1
100 CAPSULE ORAL 2 TIMES DAILY
Qty: 20 CAPSULE | Refills: 0 | Status: SHIPPED | OUTPATIENT
Start: 2022-11-29 | End: 2022-12-09

## 2022-11-29 RX ORDER — TRIAMCINOLONE ACETONIDE 40 MG/ML
40 INJECTION, SUSPENSION INTRA-ARTICULAR; INTRAMUSCULAR ONCE
Status: COMPLETED | OUTPATIENT
Start: 2022-11-29 | End: 2022-11-29

## 2022-11-29 RX ADMIN — TRIAMCINOLONE ACETONIDE 40 MG: 40 INJECTION, SUSPENSION INTRA-ARTICULAR; INTRAMUSCULAR at 11:24

## 2022-11-29 ASSESSMENT — ENCOUNTER SYMPTOMS
RESPIRATORY NEGATIVE: 1
GASTROINTESTINAL NEGATIVE: 1
SINUS PAIN: 1
EYES NEGATIVE: 1
ALLERGIC/IMMUNOLOGIC NEGATIVE: 1
SINUS PRESSURE: 1

## 2022-11-29 NOTE — PATIENT INSTRUCTIONS
Lots of water  Mucinex to thin secretion  Steroid shot today and may start medrol pack tomorrow  May start antibiotic for sinus if needed  Use flonase can do twice a day for 7 days. Drink lots water gatorade.

## 2022-11-29 NOTE — PROGRESS NOTES
[facility]  [facility]  [address] [place] [address]  [address] [place] [address]  Dept: [phone]  Dept Fax: [phone]  Loc: [phone]    Leanne Ospina is a 32 y.o. male who presents today for his medical conditions/complaints as noted below. Leanne Ospina is c/o of Fever        HPI:     HPI   Chief Complaint   Patient presents with    Fever   His wife and kids of all had the flu. He has been sick for over a week. He had fever and chills over a week ago and it seemed to got better and then he started having chills again 2 nights ago. He does have a history of frequent sinus infections and he is very congested in his sinuses. History reviewed. No pertinent past medical history.    Past Surgical History:   Procedure Laterality Date    BONE GRAFT      BONE GRAFT      Dr Quezada Height Left        Vitals 11/29/2022 8/31/2022 8/6/2021 11/1/2018 12/4/2017 7/73/7081   SYSTOLIC 833 [phone]   DIASTOLIC 80 76 82 84 80 83   Site - - - Left Upper Arm Right Arm -   Position - - - Sitting Sitting -   Cuff Size - - - Medium Adult Medium Adult -   Pulse 97 66 85 72 64 72   Temp 98.5 97 98.9 97.1 97.8 98.6   Resp 16 18 16 16 16 16   SpO2 97 97 97 98 99 -   Weight 214 lb 225 lb 9.6 oz 215 lb 12.8 oz 200 lb 186 lb 184 lb 4.8 oz   Height 6' 0\" 6' 0\" 6' 0\" 6' 0\" 6' 0\" 6' 0\"   Body mass index 29.02 kg/m2 30.59 kg/m2 29.26 kg/m2 27.12 kg/m2 25.22 kg/m2 24.99 kg/m2       Family History   Problem Relation Age of Onset    Cancer Father         bladder       Social History     Tobacco Use    Smoking status: Never    Smokeless tobacco: Never   Substance Use Topics    Alcohol use: No      Current Outpatient Medications on File Prior to Visit   Medication Sig Dispense Refill    fluticasone (FLONASE) 50 MCG/ACT nasal spray 2 sprays by Each Nostril route daily 1 Bottle 0    fexofenadine-pseudoephedrine (ALLEGRA-D ALLERGY & CONGESTION) 180-240 MG per extended release tablet Take 1 tablet by mouth daily 90 tablet 1     No current facility-administered medications on file prior to visit. Allergies   Allergen Reactions    Cefprozil Hives       Health Maintenance   Topic Date Due    Varicella vaccine (1 of 2 - 2-dose childhood series) Never done    HIV screen  Never done    Hepatitis C screen  Never done    COVID-19 Vaccine (2 - Pfizer series) 08/09/2021    Flu vaccine (1) Never done    Depression Screen  08/31/2023    DTaP/Tdap/Td vaccine (2 - Td or Tdap) 11/01/2028    Hepatitis A vaccine  Aged Out    Hib vaccine  Aged Out    Meningococcal (ACWY) vaccine  Aged Out    Pneumococcal 0-64 years Vaccine  Aged Out       Subjective:      Review of Systems   Constitutional:  Positive for chills and fatigue. HENT:  Positive for congestion, sinus pressure and sinus pain. Eyes: Negative. Respiratory: Negative. Cardiovascular: Negative. Gastrointestinal: Negative. Endocrine: Negative. Genitourinary: Negative. Musculoskeletal: Negative. Allergic/Immunologic: Negative. Neurological: Negative. Hematological: Negative. Psychiatric/Behavioral: Negative. Objective:     Physical Exam  Vitals and nursing note reviewed. Constitutional:       Appearance: Normal appearance. He is well-developed. HENT:      Head: Normocephalic. Right Ear: External ear normal. A middle ear effusion is present. Left Ear: External ear normal. A middle ear effusion is present. Nose: Nose normal.   Cardiovascular:      Rate and Rhythm: Normal rate and regular rhythm. Heart sounds: Normal heart sounds. Pulmonary:      Effort: Pulmonary effort is normal.      Breath sounds: Normal breath sounds. Skin:     General: Skin is warm and dry. Capillary Refill: Capillary refill takes less than 2 seconds. Neurological:      General: No focal deficit present. Mental Status: He is alert and oriented to person, place, and time.    Psychiatric:         Mood and Affect: Mood normal. Behavior: Behavior normal.         Thought Content: Thought content normal.         Judgment: Judgment normal.     /80   Pulse 97   Temp 98.5 °F (36.9 °C) (Temporal)   Resp 16   Ht 6' (1.829 m)   Wt 214 lb (97.1 kg)   SpO2 97%   BMI 29.02 kg/m²     Assessment:       Diagnosis Orders   1. Influenza A        2. Fever, unspecified fever cause  POCT Influenza A/B            Plan:   More than 50% of the time was spent counseling and coordinating care for a total time of 20min face to face. No results found for: GLUCOSE, LABA1C  Results for POC orders placed in visit on 11/29/22   POCT Influenza A/B   Result Value Ref Range    Influenza A Ab positive     Influenza B Ab neg      PDMP Monitoring:    Last PDMP Janes as Reviewed:  Review User Review Instant Review Result            Urine Drug Screenings (1 yr)    No resulted procedures found. Medication Contract and Consent for Opioid Use Documents Filed        No documents found                     Patient given educational materials -see patient instructions. Discussed use, benefit, and side effects of prescribed medications. All patient questions answered. Pt voiced understanding. Reviewed health maintenance. Instructed to continue currentmedications, diet and exercise. Patient agreed with treatment plan. Follow up as directed. MEDICATIONS:  Orders Placed This Encounter   Medications    triamcinolone acetonide (KENALOG-40) injection 40 mg    methylPREDNISolone (MEDROL, CELSO,) 4 MG tablet     Sig: Take by mouth. Dispense:  1 kit     Refill:  0    doxycycline hyclate (VIBRAMYCIN) 100 MG capsule     Sig: Take 1 capsule by mouth 2 times daily for 10 days     Dispense:  20 capsule     Refill:  0     May sub tabs if cheaper         ORDERS:  Orders Placed This Encounter   Procedures    POCT Influenza A/B       Follow-up:  Return if symptoms worsen or fail to improve.     PATIENT INSTRUCTIONS:  Patient Instructions   Lots of water  Mucinex to thin secretion  Steroid shot today and may start medrol pack tomorrow  May start antibiotic for sinus if needed  Use flonase can do twice a day for 7 days. Drink lots water gatorade. Electronically signed by BRUCE Bowman CNP on 11/29/2022 at 12:21 PM    EMR Dragon/transcription disclaimer:  Much of thisencounter note is electronic transcription/translation of spoken language to printed texts. The electronic translation of spoken language may be erroneous, or at times, nonsensical words or phrases may be inadvertentlytranscribed.   Although I have reviewed the note for such errors, some may still exist.

## 2023-03-28 DIAGNOSIS — J30.9 CHRONIC ALLERGIC RHINITIS: Primary | ICD-10-CM

## 2023-03-28 RX ORDER — MONTELUKAST SODIUM 10 MG/1
10 TABLET ORAL DAILY
Qty: 30 TABLET | Refills: 3 | Status: SHIPPED | OUTPATIENT
Start: 2023-03-28

## 2023-03-28 RX ORDER — FLUTICASONE PROPIONATE 50 MCG
2 SPRAY, SUSPENSION (ML) NASAL DAILY
Qty: 16 G | Refills: 5 | Status: SHIPPED | OUTPATIENT
Start: 2023-03-28

## 2024-02-26 ENCOUNTER — OFFICE VISIT (OUTPATIENT)
Dept: PRIMARY CARE CLINIC | Age: 29
End: 2024-02-26
Payer: COMMERCIAL

## 2024-02-26 VITALS
RESPIRATION RATE: 16 BRPM | HEIGHT: 72 IN | BODY MASS INDEX: 29.8 KG/M2 | TEMPERATURE: 97.3 F | SYSTOLIC BLOOD PRESSURE: 118 MMHG | OXYGEN SATURATION: 97 % | HEART RATE: 83 BPM | DIASTOLIC BLOOD PRESSURE: 80 MMHG | WEIGHT: 220 LBS

## 2024-02-26 DIAGNOSIS — B97.89 VIRAL LARYNGITIS: Primary | ICD-10-CM

## 2024-02-26 DIAGNOSIS — J02.9 SORE THROAT: ICD-10-CM

## 2024-02-26 DIAGNOSIS — J04.0 VIRAL LARYNGITIS: Primary | ICD-10-CM

## 2024-02-26 LAB — S PYO AG THROAT QL: NORMAL

## 2024-02-26 PROCEDURE — 99213 OFFICE O/P EST LOW 20 MIN: CPT | Performed by: FAMILY MEDICINE

## 2024-02-26 ASSESSMENT — PATIENT HEALTH QUESTIONNAIRE - PHQ9
SUM OF ALL RESPONSES TO PHQ9 QUESTIONS 1 & 2: 0
SUM OF ALL RESPONSES TO PHQ QUESTIONS 1-9: 0
SUM OF ALL RESPONSES TO PHQ QUESTIONS 1-9: 0
2. FEELING DOWN, DEPRESSED OR HOPELESS: 0
SUM OF ALL RESPONSES TO PHQ QUESTIONS 1-9: 0
SUM OF ALL RESPONSES TO PHQ QUESTIONS 1-9: 0

## 2024-02-26 ASSESSMENT — ENCOUNTER SYMPTOMS
NAUSEA: 0
COUGH: 0
VOMITING: 0
BLOOD IN STOOL: 0
ABDOMINAL PAIN: 0
WHEEZING: 0
CHEST TIGHTNESS: 0
SORE THROAT: 1
SHORTNESS OF BREATH: 0

## 2024-03-22 DIAGNOSIS — J30.9 CHRONIC ALLERGIC RHINITIS: ICD-10-CM

## 2024-03-22 RX ORDER — MONTELUKAST SODIUM 10 MG/1
10 TABLET ORAL DAILY
Qty: 30 TABLET | Refills: 3 | Status: SHIPPED | OUTPATIENT
Start: 2024-03-22

## 2024-04-16 DIAGNOSIS — J30.9 CHRONIC ALLERGIC RHINITIS: ICD-10-CM

## 2024-04-16 RX ORDER — MONTELUKAST SODIUM 10 MG/1
10 TABLET ORAL DAILY
Qty: 30 TABLET | Refills: 3 | Status: SHIPPED | OUTPATIENT
Start: 2024-04-16

## 2024-06-19 ENCOUNTER — OFFICE VISIT (OUTPATIENT)
Dept: PRIMARY CARE CLINIC | Age: 29
End: 2024-06-19
Payer: COMMERCIAL

## 2024-06-19 VITALS
DIASTOLIC BLOOD PRESSURE: 72 MMHG | SYSTOLIC BLOOD PRESSURE: 122 MMHG | BODY MASS INDEX: 30.34 KG/M2 | RESPIRATION RATE: 18 BRPM | HEART RATE: 88 BPM | OXYGEN SATURATION: 97 % | TEMPERATURE: 97 F | HEIGHT: 72 IN | WEIGHT: 224 LBS

## 2024-06-19 DIAGNOSIS — J32.9 RECURRENT SINUS INFECTIONS: Primary | ICD-10-CM

## 2024-06-19 PROCEDURE — 99213 OFFICE O/P EST LOW 20 MIN: CPT | Performed by: FAMILY MEDICINE

## 2024-06-19 RX ORDER — CLARITHROMYCIN 500 MG/1
500 TABLET, COATED ORAL 2 TIMES DAILY
Qty: 20 TABLET | Refills: 0 | Status: SHIPPED | OUTPATIENT
Start: 2024-06-19

## 2024-06-19 SDOH — ECONOMIC STABILITY: INCOME INSECURITY: HOW HARD IS IT FOR YOU TO PAY FOR THE VERY BASICS LIKE FOOD, HOUSING, MEDICAL CARE, AND HEATING?: NOT HARD AT ALL

## 2024-06-19 SDOH — ECONOMIC STABILITY: FOOD INSECURITY: WITHIN THE PAST 12 MONTHS, THE FOOD YOU BOUGHT JUST DIDN'T LAST AND YOU DIDN'T HAVE MONEY TO GET MORE.: NEVER TRUE

## 2024-06-19 SDOH — ECONOMIC STABILITY: HOUSING INSECURITY
IN THE LAST 12 MONTHS, WAS THERE A TIME WHEN YOU DID NOT HAVE A STEADY PLACE TO SLEEP OR SLEPT IN A SHELTER (INCLUDING NOW)?: NO

## 2024-06-19 SDOH — ECONOMIC STABILITY: FOOD INSECURITY: WITHIN THE PAST 12 MONTHS, YOU WORRIED THAT YOUR FOOD WOULD RUN OUT BEFORE YOU GOT MONEY TO BUY MORE.: NEVER TRUE

## 2024-06-19 ASSESSMENT — ENCOUNTER SYMPTOMS
RHINORRHEA: 1
SINUS PRESSURE: 1
COUGH: 1
SINUS PAIN: 1

## 2024-06-19 NOTE — PATIENT INSTRUCTIONS
We are committed to providing you with the best care possible.   In order to help us achieve these goals please remember to bring all medications, herbal products, and over the counter supplements with you to each visit.     If your provider has ordered testing for you, please be sure to follow up with our office if you have not received results within 7 days after the testing took place.     *If you receive a survey after visiting one of our offices, please take time to share your experience concerning your physician office visit. These surveys are confidential and no health information about you is shared.  We are eager to improve for you and we are counting on your feedback to help make that happen.         Wt Readings from Last 3 Encounters:   06/19/24 101.6 kg (224 lb)   02/26/24 99.8 kg (220 lb)   12/19/23 98.9 kg (218 lb)

## 2024-06-19 NOTE — PROGRESS NOTES
SUBJECTIVE:    Triston Rowland is 28 y.o.male who comes in complaining of Congestion and Fever (Started two weeks ago, last fever was three or four days ago but never checked it felt it was low grade )   .       HPI: Triston comes in today complaining of increasing head congestion and fever.  He has a long history of recurrent sinusitis and he had a broken nose in high school.  He saw Dr. Noland but he did not want to do anything until he was fully grown.  He is always had problems with severe allergies.  This year he started taking Allegra-D and Singulair in February after he developed a sinus infection and he says it has been wonderful.  His allergies and congestion have been much better controlled.  However this infection started over 2 weeks ago.  He had a low-grade fever and was tired.  He then developed increasing head congestion.  It is not getting better.  There is a lot of sinus pressure.      Allergies   Allergen Reactions    Cefprozil Hives       Social History     Socioeconomic History    Marital status:    Tobacco Use    Smoking status: Never    Smokeless tobacco: Never   Vaping Use    Vaping Use: Never used   Substance and Sexual Activity    Alcohol use: No    Drug use: No    Sexual activity: Yes     Partners: Female     Social Determinants of Health     Financial Resource Strain: Low Risk  (6/19/2024)    Overall Financial Resource Strain (CARDIA)     Difficulty of Paying Living Expenses: Not hard at all   Food Insecurity: No Food Insecurity (6/19/2024)    Hunger Vital Sign     Worried About Running Out of Food in the Last Year: Never true     Ran Out of Food in the Last Year: Never true   Transportation Needs: Unknown (6/19/2024)    PRAPARE - Transportation     Lack of Transportation (Non-Medical): No   Housing Stability: Unknown (6/19/2024)    Housing Stability Vital Sign     Unstable Housing in the Last Year: No       Review of Systems   Constitutional:  Positive for fatigue and fever.   HENT:

## 2024-06-28 DIAGNOSIS — R22.2 LOCALIZED SWELLING, MASS AND LUMP, TRUNK: Primary | ICD-10-CM

## 2024-07-09 DIAGNOSIS — R22.2 LOCALIZED SWELLING, MASS AND LUMP, TRUNK: ICD-10-CM

## 2024-08-01 ENCOUNTER — OFFICE VISIT (OUTPATIENT)
Dept: OTOLARYNGOLOGY | Facility: CLINIC | Age: 29
End: 2024-08-01
Payer: COMMERCIAL

## 2024-08-01 VITALS — SYSTOLIC BLOOD PRESSURE: 122 MMHG | HEART RATE: 84 BPM | TEMPERATURE: 98 F | DIASTOLIC BLOOD PRESSURE: 86 MMHG

## 2024-08-01 DIAGNOSIS — J34.2 NASAL SEPTAL DEVIATION: Primary | ICD-10-CM

## 2024-08-01 DIAGNOSIS — J34.89 SINUS PAIN: ICD-10-CM

## 2024-08-01 DIAGNOSIS — R09.81 NASAL CONGESTION: ICD-10-CM

## 2024-08-01 DIAGNOSIS — J32.9 RECURRENT SINUS INFECTIONS: ICD-10-CM

## 2024-08-01 RX ORDER — FEXOFENADINE HCL AND PSEUDOEPHEDRINE HCL 180; 240 MG/1; MG/1
1 TABLET, EXTENDED RELEASE ORAL DAILY
COMMUNITY

## 2024-08-01 RX ORDER — MONTELUKAST SODIUM 10 MG/1
10 TABLET ORAL
COMMUNITY
Start: 2024-04-16

## 2024-08-01 RX ORDER — AZELASTINE 1 MG/ML
2 SPRAY, METERED NASAL 2 TIMES DAILY
Qty: 30 ML | Refills: 11 | Status: SHIPPED | OUTPATIENT
Start: 2024-08-01

## 2024-08-01 RX ORDER — FLUTICASONE PROPIONATE 50 MCG
2 SPRAY, SUSPENSION (ML) NASAL DAILY
Qty: 16 G | Refills: 11 | Status: SHIPPED | OUTPATIENT
Start: 2024-08-01

## 2024-08-01 NOTE — PATIENT INSTRUCTIONS
CONTACT INFORMATION:  The main office phone number is [phone]. For emergencies after hours and on weekends, this number will convert over to our answering service and the on call provider will answer. Please try to keep non emergent phone calls/ questions to office hours 9am-5pm Monday through Friday.      [employer]  As an alternative, you can sign up and use the Epic MyChart system for more direct and quicker access for non emergent questions/ problems.  [email or web address] allows you to send messages to your doctor, view your test results, renew your prescriptions, schedule appointments, and more. To sign up, go to [employer] and click on the Sign Up Now link in the New User? box. Enter your [employer] Activation Code exactly as it appears below along with the last four digits of your Social Security Number and your Date of Birth () to complete the sign-up process. If you do not sign up before the expiration date, you must request a new code.     [employer] Activation Code: Activation code not generated  Current [employer] Status: Active     If you have questions, you can email [email or web address] or call [phone] to talk to our [employer] staff. Remember, [employer] is NOT to be used for urgent needs. For medical emergencies, dial 911.     IF YOU SMOKE OR USE TOBACCO PLEASE READ THE FOLLOWING:  Why is smoking bad for me?  Smoking increases the risk of heart disease, lung disease, vascular disease, stroke, and cancer. If you smoke, STOP!        IF YOU SMOKE OR USE TOBACCO PLEASE READ THE FOLLOWING:  Why is smoking bad for me?  Smoking increases the risk of heart disease, lung disease, vascular disease, stroke, and cancer. If you smoke, STOP!     For more information:  Quit Now [place]  -QUIT-NOW  https://[email or web address].[email or web address]/en-US/

## 2024-08-01 NOTE — PROGRESS NOTES
YOB: 1995  Location: [facility]  Location Address: [address] [place] [address], [facility] [address], [address] [place] [address]  Location Phone: [phone]    Chief Complaint   Patient presents with    Sinusitis     Broken nose that was not fixed. Patient has trouble breathing through nose, has a lot of nasal drainage. Left side feels plugged and can not breathe through that side. Sinus infections often.        History of Present Illness  Chavo Potts is a 29 y.o. male.  Chavo Potts is here for evaluation of ENT complaints. The patient has had problems with the congestion, difficulty breathing through the nose, postnasal drainage, rhinorrhea, recurrent sinusitis, and sinus pain and pressure.  Symptoms are worse on the left side.  He has tried oral antihistamines as well as nasal sprays for several years without relief.  He states that he has broken his nose in the past.  He has not had any imaging.     History reviewed. No pertinent past medical history.    Past Surgical History:   Procedure Laterality Date    BONE GRAFT      KNEE SURGERY         Outpatient Medications Marked as Taking for the 24 encounter (Office Visit) with Corey Sr APRN   Medication Sig Dispense Refill    fexofenadine-pseudoephedrine (ALLEGRA-D 24) 180-240 MG per 24 hr tablet Take 1 tablet by mouth Daily.      montelukast (SINGULAIR) 10 MG tablet Take 1 tablet by mouth.         Cefprozil    Family History   Problem Relation Age of Onset    Cancer Father        Social History     Socioeconomic History    Marital status:    Tobacco Use    Smoking status: Never    Smokeless tobacco: Never   Vaping Use    Vaping status: Never Used   Substance and Sexual Activity    Alcohol use: No    Drug use: No    Sexual activity: Defer       Review of Systems   Constitutional: Negative.    HENT:  Positive for congestion, postnasal drip, rhinorrhea, sinus pressure and sinus pain.    Respiratory: Negative.     Neurological:  Positive for headaches.        Vitals:    08/01/24 0904   BP: 122/86   Pulse: 84   Temp: 98 °F (36.7 °C)       There is no height or weight on file to calculate BMI.    Objective     Physical Exam  Vitals reviewed.   Constitutional:       Appearance: Normal appearance. He is obese.   HENT:      Head: Normocephalic.      Right Ear: Tympanic membrane, ear canal and external ear normal.      Left Ear: Tympanic membrane, ear canal and external ear normal.      Nose: Septal deviation present.      Right Turbinates: Enlarged.      Left Turbinates: Enlarged.      Comments: Significant septal deviation noted     Mouth/Throat:      Lips: Pink.      Mouth: Mucous membranes are moist.      Pharynx: Oropharynx is clear.   Musculoskeletal:      Cervical back: Full passive range of motion without pain.   Neurological:      Mental Status: He is alert.   Psychiatric:         Behavior: Behavior is cooperative.         Assessment & Plan   Diagnoses and all orders for this visit:    1. Nasal septal deviation (Primary)  -     CT Sinus Without Contrast; Future    2. Nasal congestion  -     CT Sinus Without Contrast; Future    3. Sinus pain  -     CT Sinus Without Contrast; Future    4. Recurrent sinus infections  -     CT Sinus Without Contrast; Future    Other orders  -     fluticasone (FLONASE) 50 MCG/ACT nasal spray; 2 sprays into the nostril(s) as directed by provider Daily.  Dispense: 16 g; Refill: 11  -     azelastine (ASTELIN) 0.1 % nasal spray; 2 sprays into the nostril(s) as directed by provider 2 (Two) Times a Day. Use in each nostril as directed  Dispense: 30 mL; Refill: 11      * Surgery not found *  Orders Placed This Encounter   Procedures    CT Sinus Without Contrast     Standing Status:   Future     Standing Expiration Date:   8/1/2025     Scheduling Instructions:      Stealth -  image guidance     Order Specific Question:   Release to patient     Answer:   Routine Release [[id number]]     Order Specific Question:   Reason for Exam:     Answer:    Nasal septal deviation     Flonase and Astelin daily  CT sinuses  Return for problems    Return in about 5 weeks (around 2024) for Recheck, CT with Dr. Duran.       Patient Instructions   CONTACT INFORMATION:  The main office phone number is [phone]. For emergencies after hours and on weekends, this number will convert over to our answering service and the on call provider will answer. Please try to keep non emergent phone calls/ questions to office hours 9am-5pm Monday through Friday.      [employer]  As an alternative, you can sign up and use the Epic MyChart system for more direct and quicker access for non emergent questions/ problems.  [employer] allows you to send messages to your doctor, view your test results, renew your prescriptions, schedule appointments, and more. To sign up, go to [employer] and click on the Sign Up Now link in the New User? box. Enter your [employer] Activation Code exactly as it appears below along with the last four digits of your Social Security Number and your Date of Birth () to complete the sign-up process. If you do not sign up before the expiration date, you must request a new code.     [employer] Activation Code: Activation code not generated  Current [employer] Status: Active     If you have questions, you can email [email or web address] or call [phone] to talk to our [employer] staff. Remember, [employer] is NOT to be used for urgent needs. For medical emergencies, dial 911.     IF YOU SMOKE OR USE TOBACCO PLEASE READ THE FOLLOWING:  Why is smoking bad for me?  Smoking increases the risk of heart disease, lung disease, vascular disease, stroke, and cancer. If you smoke, STOP!        IF YOU SMOKE OR USE TOBACCO PLEASE READ THE FOLLOWING:  Why is smoking bad for me?  Smoking increases the risk of heart disease, lung disease, vascular disease, stroke, and cancer. If you smoke, STOP!     For more information:  Quit Now  [place]  1-800-QUIT-NOW  https://[email or web address].quitlogix.org/en-US/

## 2024-08-05 RX ORDER — DOXYCYCLINE HYCLATE 100 MG
100 TABLET ORAL 2 TIMES DAILY
Qty: 14 TABLET | Refills: 0 | Status: SHIPPED | OUTPATIENT
Start: 2024-08-05 | End: 2024-08-12

## 2024-08-13 ENCOUNTER — TELEPHONE (OUTPATIENT)
Dept: OTOLARYNGOLOGY | Facility: CLINIC | Age: 29
End: 2024-08-13
Payer: COMMERCIAL

## 2024-08-13 ENCOUNTER — HOSPITAL ENCOUNTER (OUTPATIENT)
Dept: CT IMAGING | Facility: HOSPITAL | Age: 29
Discharge: HOME | End: 2024-08-13
Admitting: NURSE PRACTITIONER
Payer: COMMERCIAL

## 2024-08-13 DIAGNOSIS — J34.2 NASAL SEPTAL DEVIATION: ICD-10-CM

## 2024-08-13 DIAGNOSIS — R09.81 NASAL CONGESTION: ICD-10-CM

## 2024-08-13 DIAGNOSIS — J34.89 SINUS PAIN: ICD-10-CM

## 2024-08-13 DIAGNOSIS — J32.9 RECURRENT SINUS INFECTIONS: ICD-10-CM

## 2024-08-13 PROCEDURE — 70486 CT MAXILLOFACIAL W/O DYE: CPT

## 2024-08-13 NOTE — TELEPHONE ENCOUNTER
----- Message from Corey Sr sent at 8/13/2024 11:26 AM CDT -----  Ct results-keep f/u with Dr. Duran

## 2024-08-22 DIAGNOSIS — J30.9 CHRONIC ALLERGIC RHINITIS: ICD-10-CM

## 2024-08-22 RX ORDER — MONTELUKAST SODIUM 10 MG/1
10 TABLET ORAL DAILY
Qty: 30 TABLET | Refills: 3 | Status: SHIPPED | OUTPATIENT
Start: 2024-08-22

## 2024-09-09 NOTE — PROGRESS NOTES
YOB: 1995  Location: [facility]  Location Address: [address] [place] [address],  [address], [address] [place] [address]  Location Phone: [phone]    Chief Complaint   Patient presents with    Sinus Problem     F/u         History of Present Illness  Chavo Potts is a 29 y.o. male.  Chavo Potts is here for follow up of ENT complaints. The patient has had problems with congestion, difficulty breathing through the nose, postnasal drainage, rhinorrhea, recurrent sinusitis, and sinus pain and pressure.  Symptoms are worse on the left side.  He has been using Astelin and Flonase as directed.  He has had a CT sinus.    Reviewed:    Study Result    Narrative & Impression   CT SINUS WO CONTRAST- 2024 9:34 AM     HISTORY: Nasal septal deviation; J34.2-Deviated nasal septum;  R09.81-Nasal congestion; J34.89-Other specified disorders of nose and  nasal sinuses; J32.9-Chronic sinusitis, unspecified     COMPARISON: NONE.      DOSE LENGTH PRODUCT: 411.14 mGy.cm. Automated exposure control was also  utilized to decrease patient radiation dose.     TECHNIQUE: Helical tomographic images of the sinuses were obtained  without contrast. Multiplanar reformatted images were provided for  review.     FINDINGS:     Bilateral maxillary sinus mucus retention cysts, large on the left and  also opacifying a portion of the infundibulum. No sinus expansion.  Frontal sinuses and anterior ethmoids are clear, as are the frontal  recesses. Chronic mucosal thickening in the left sphenoid sinus. [place]  left septal bowing with large left-sided spur which is impacted on the  left inferior turbinate. Right middle turbinate paradoxical curvature.  Anterior skull base is intact. Facial bones are intact. Normal alignment  at the TMJs. Mastoids are clear. Visualized intracranial contents are  unremarkable. No inflammatory changes in the deep neck. Nasopharyngeal  airway is unremarkable.       IMPRESSION:  1. Bilateral maxillary sinus mucus retention  cysts, larger on the left  and mostly opacifying the sinus. No sinus expansion to suggest mucocele.  2. Additional chronic mucosal thickening in the left sphenoid sinus. No  acute sinusitis.  3. [place] left septal bowing with large left-sided spur, impacted on the  left inferior turbinate.              This report was signed and finalized on 8/13/2024 11:03 AM by Dr Jh Bradley.           History reviewed. No pertinent past medical history.    Past Surgical History:   Procedure Laterality Date    BONE GRAFT      KNEE SURGERY         Outpatient Medications Marked as Taking for the 9/10/24 encounter (Office Visit) with Nathanael Duran MD   Medication Sig Dispense Refill    azelastine (ASTELIN) 0.1 % nasal spray 2 sprays into the nostril(s) as directed by provider 2 (Two) Times a Day. Use in each nostril as directed 30 mL 11    fexofenadine-pseudoephedrine (ALLEGRA-D 24) 180-240 MG per 24 hr tablet Take 1 tablet by mouth Daily.      fluticasone (FLONASE) 50 MCG/ACT nasal spray 2 sprays into the nostril(s) as directed by provider Daily. 16 g 11    montelukast (SINGULAIR) 10 MG tablet Take 1 tablet by mouth.         Cefprozil    Family History   Problem Relation Age of Onset    Cancer Father        Social History     Socioeconomic History    Marital status:    Tobacco Use    Smoking status: Never    Smokeless tobacco: Never   Vaping Use    Vaping status: Never Used   Substance and Sexual Activity    Alcohol use: No    Drug use: No    Sexual activity: Defer       Review of Systems   Constitutional: Negative.    HENT:  Positive for congestion, sinus pressure and sinus pain.        Vitals:    09/10/24 1456   BP: 145/89   Pulse: 86   Temp: 98.6 °F (37 °C)       Body mass index is 28.88 kg/m².    Objective     Physical Exam  Vitals reviewed.   Constitutional:       Appearance: Normal appearance. He is obese.   HENT:      Head: Normocephalic.      Right Ear: Tympanic membrane, ear canal and external ear normal.       Left Ear: Tympanic membrane, ear canal and external ear normal.      Nose: Septal deviation present.      Right Turbinates: Enlarged.      Left Turbinates: Enlarged.      Comments: Significant septal deviation noted    See endoscopy     Mouth/Throat:      Lips: Pink.      Mouth: Mucous membranes are moist.      Pharynx: Oropharynx is clear.   Musculoskeletal:      Cervical back: Full passive range of motion without pain.   Neurological:      Mental Status: He is alert.   Psychiatric:         Behavior: Behavior is cooperative.         Assessment & Plan   Diagnoses and all orders for this visit:    1. Chronic rhinosinusitis (Primary)  -     Case Request; Standing  -     Comprehensive Metabolic Panel; Future  -     CBC (No Diff); Future  -     Protime-INR; Future  -     APTT; Future  -     ECG 12 Lead; Future  -     XR Chest 2 View; Future  -     oxymetazoline (AFRIN) nasal spray 2 spray  -     dexAMETHasone (DECADRON) 8 mg in sodium chloride 0.9 % IVPB  -     Case Request    2. Nasal septal deviation  -     Case Request; Standing  -     Comprehensive Metabolic Panel; Future  -     CBC (No Diff); Future  -     Protime-INR; Future  -     APTT; Future  -     ECG 12 Lead; Future  -     XR Chest 2 View; Future  -     oxymetazoline (AFRIN) nasal spray 2 spray  -     dexAMETHasone (DECADRON) 8 mg in sodium chloride 0.9 % IVPB  -     Case Request    3. Hypertrophy of both inferior nasal turbinates  -     Case Request; Standing  -     Comprehensive Metabolic Panel; Future  -     CBC (No Diff); Future  -     Protime-INR; Future  -     APTT; Future  -     ECG 12 Lead; Future  -     XR Chest 2 View; Future  -     oxymetazoline (AFRIN) nasal spray 2 spray  -     dexAMETHasone (DECADRON) 8 mg in sodium chloride 0.9 % IVPB  -     Case Request    4. Nasal congestion  -     Case Request; Standing  -     Comprehensive Metabolic Panel; Future  -     CBC (No Diff); Future  -     Protime-INR; Future  -     APTT; Future  -     ECG 12  Lead; Future  -     XR Chest 2 View; Future  -     oxymetazoline (AFRIN) nasal spray 2 spray  -     dexAMETHasone (DECADRON) 8 mg in sodium chloride 0.9 % IVPB  -     Case Request    5. Sinus pain  -     Case Request; Standing  -     Comprehensive Metabolic Panel; Future  -     CBC (No Diff); Future  -     Protime-INR; Future  -     APTT; Future  -     ECG 12 Lead; Future  -     XR Chest 2 View; Future  -     oxymetazoline (AFRIN) nasal spray 2 spray  -     dexAMETHasone (DECADRON) 8 mg in sodium chloride 0.9 % IVPB  -     Case Request    6. Recurrent sinus infections  -     Case Request; Standing  -     Comprehensive Metabolic Panel; Future  -     CBC (No Diff); Future  -     Protime-INR; Future  -     APTT; Future  -     ECG 12 Lead; Future  -     XR Chest 2 View; Future  -     oxymetazoline (AFRIN) nasal spray 2 spray  -     dexAMETHasone (DECADRON) 8 mg in sodium chloride 0.9 % IVPB  -     Case Request    Other orders  -     Follow Anesthesia Guidelines / Protocol; Future  -     Obtain Informed Consent  -     Follow Anesthesia Guidelines / Protocol; Standing  -     Verify NPO Status; Standing      BILATERAL ENDOSCOPIC FUNCTIONAL SINUS SURGERY W TRACKING, SINUPLASTY, SEPTOPLASTY, RESECT INFERIOR TURBINATES VIA COBLATION (Bilateral)  Orders Placed This Encounter   Procedures    XR Chest 2 View     Standing Status:   Future     Standing Expiration Date:   9/10/2025     Order Specific Question:   Reason for Exam:     Answer:   Preoperative     Order Specific Question:   Release to patient     Answer:   Routine Release [[id number]]    Comprehensive Metabolic Panel     Standing Status:   Future     Standing Expiration Date:   9/10/2025     Order Specific Question:   Release to patient     Answer:   Routine Release [[id number]]    CBC (No Diff)     Standing Status:   Future     Standing Expiration Date:   9/10/2025     Order Specific Question:   Release to patient     Answer:   Routine Release [[id number]]     Protime-INR     Standing Status:   Future     Standing Expiration Date:   9/10/2025     Order Specific Question:   Release to patient     Answer:   Routine Release [[id number]]    APTT     Standing Status:   Future     Standing Expiration Date:   9/10/2025     Order Specific Question:   Release to patient     Answer:   Routine Release [[id number]]    Obtain Informed Consent     Order Specific Question:   Informed Consent Given For     Answer:   BILATERAL ENDOSCOPIC FUNCTIONAL SINUS SURGERY W TRACKING, SINUPLASTY, SEPTOPLASTY, RESECT INFERIOR TURBINATES VIA COBLATION    ECG 12 Lead     Standing Status:   Future     Standing Expiration Date:   9/10/2025     Order Specific Question:   Reason for Exam:     Answer:   preop     Order Specific Question:   Release to patient     Answer:   Routine Release [[id number]]     SEPTOPLASTY AND TURBINOPLASTY: A septoplasty and inferior turbinoplasty were recommended. The risks and benefits were explained including but not limited to pain, bleeding, infection, risks of the general anesthesia, continued septal deviation, crusting, congestion and septal perforation. Possibilities of continued preoperative symptoms and the possible need for revision surgery and or medical therapy were discussed. Alternatives were discussed. No guarantees were made or implied. Questions were asked appropriately answered.       FUNCTIONAL ENDOSCOPIC SINUS SURGERY: A functional endoscopic sinus surgery was recommended. The risks and benefits were explained including but not limited to pain, bleeding (with the possible need for nasal packing), infection, risks of the general anesthesia, orbital injury with blurred vision or visual loss, cerebrospinal fluid leak, persistent disease, scarring, synichiae and the possibility for the need of reoperation. Possibilities of additional sinus work or less sinus work depending on the status of the nose at the time of the operation was discussed. Alternatives were  discussed. No guarantees were made or implied. Questions were asked appropriately answered.       Dr. Duran examined and discussed care with patient and agrees with treatment plan.      Return for post op.       Patient Instructions   SEPTOPLASTY AND TURBINOPLASTY: A septoplasty and inferior turbinoplasty were recommended. The risks and benefits were explained including but not limited to pain, bleeding, infection, risks of the general anesthesia, continued septal deviation, crusting, congestion and septal perforation. Possibilities of continued preoperative symptoms and the possible need for revision surgery and or medical therapy were discussed. Alternatives were discussed. No guarantees were made or implied. Questions were asked appropriately answered.       FUNCTIONAL ENDOSCOPIC SINUS SURGERY: A functional endoscopic sinus surgery was recommended. The risks and benefits were explained including but not limited to pain, bleeding (with the possible need for nasal packing), infection, risks of the general anesthesia, orbital injury with blurred vision or visual loss, cerebrospinal fluid leak, persistent disease, scarring, synichiae and the possibility for the need of reoperation. Possibilities of additional sinus work or less sinus work depending on the status of the nose at the time of the operation was discussed. Alternatives were discussed. No guarantees were made or implied. Questions were asked appropriately answered.

## 2024-09-10 ENCOUNTER — OFFICE VISIT (OUTPATIENT)
Dept: OTOLARYNGOLOGY | Facility: CLINIC | Age: 29
End: 2024-09-10
Payer: COMMERCIAL

## 2024-09-10 VITALS
HEIGHT: 72 IN | HEART RATE: 86 BPM | BODY MASS INDEX: 28.85 KG/M2 | WEIGHT: 213 LBS | TEMPERATURE: 98.6 F | SYSTOLIC BLOOD PRESSURE: 145 MMHG | DIASTOLIC BLOOD PRESSURE: 89 MMHG

## 2024-09-10 DIAGNOSIS — J32.9 RECURRENT SINUS INFECTIONS: ICD-10-CM

## 2024-09-10 DIAGNOSIS — J34.2 NASAL SEPTAL DEVIATION: ICD-10-CM

## 2024-09-10 DIAGNOSIS — J34.3 HYPERTROPHY OF BOTH INFERIOR NASAL TURBINATES: ICD-10-CM

## 2024-09-10 DIAGNOSIS — J34.89 SINUS PAIN: ICD-10-CM

## 2024-09-10 DIAGNOSIS — J32.9 CHRONIC RHINOSINUSITIS: Primary | ICD-10-CM

## 2024-09-10 DIAGNOSIS — R09.81 NASAL CONGESTION: ICD-10-CM

## 2024-09-10 RX ORDER — OXYMETAZOLINE HYDROCHLORIDE 0.05 G/100ML
2 SPRAY NASAL
OUTPATIENT
Start: 2024-09-10 | End: 2024-09-10

## 2024-09-10 NOTE — PROGRESS NOTES
PROCEDURE NOTE    Chavo Potts    DATE OF PROCEDURE: 9/10/2024    PROCEDURE:   Bilateral Diagnostic Rigid Nasal Endoscopy    PREPROCEDURE DIAGNOSIS:   Chronic left antral fullness with periorbital pain and pressure associated with bilateral nasal congestion left greater than right    POSTPROCEDURE DIAGNOSIS:  SAME    ANESTHESIA:   None    PROCEDURE DESCRIPTION:    With the patient in the chair bilateral diagnostic rigid nasal endoscopy was performed with the [place]ort[place] 0° endoscope without difficulty.     An endoscope was passed along the left nasal floor to the nasopharynx.  It was then passed into the region of the middle meatus, middle turbinate, and the sphenoethmoid region. An identical procedure was performed on the right side.  The following findings were noted as stated below:    Findings: Severe left septal deformity near the nasal vestibule extending with septal spur along the inferior turbinate to the middle meatus.  No polyposis is noted bilaterally.  The right nasal cavity demonstrates mild inflammation of the middle meatus with no evidence of inflammation otherwise.    Condition:  Stable.  Patient tolerated procedure well.    Complications:  None  There was no significant bleeding.

## 2024-09-11 PROBLEM — J34.89 SINUS PAIN: Status: ACTIVE | Noted: 2024-09-10

## 2024-09-11 PROBLEM — R09.81 NASAL CONGESTION: Status: ACTIVE | Noted: 2024-09-10

## 2024-09-11 PROBLEM — J32.9 RECURRENT SINUS INFECTIONS: Status: ACTIVE | Noted: 2024-09-10

## 2024-11-11 ENCOUNTER — HOSPITAL ENCOUNTER (OUTPATIENT)
Dept: GENERAL RADIOLOGY | Facility: HOSPITAL | Age: 29
Discharge: HOME | End: 2024-11-11
Payer: COMMERCIAL

## 2024-11-11 ENCOUNTER — PRE-ADMISSION TESTING (OUTPATIENT)
Dept: PREADMISSION TESTING | Facility: HOSPITAL | Age: 29
End: 2024-11-11
Payer: COMMERCIAL

## 2024-11-11 VITALS
DIASTOLIC BLOOD PRESSURE: 94 MMHG | HEART RATE: 63 BPM | WEIGHT: 221.78 LBS | HEIGHT: 71 IN | OXYGEN SATURATION: 98 % | BODY MASS INDEX: 31.05 KG/M2 | SYSTOLIC BLOOD PRESSURE: 140 MMHG | RESPIRATION RATE: 20 BRPM

## 2024-11-11 DIAGNOSIS — J32.9 RECURRENT SINUS INFECTIONS: ICD-10-CM

## 2024-11-11 DIAGNOSIS — J34.3 HYPERTROPHY OF BOTH INFERIOR NASAL TURBINATES: ICD-10-CM

## 2024-11-11 DIAGNOSIS — J32.9 CHRONIC RHINOSINUSITIS: ICD-10-CM

## 2024-11-11 DIAGNOSIS — J34.2 NASAL SEPTAL DEVIATION: ICD-10-CM

## 2024-11-11 DIAGNOSIS — R09.81 NASAL CONGESTION: ICD-10-CM

## 2024-11-11 DIAGNOSIS — J34.89 SINUS PAIN: ICD-10-CM

## 2024-11-11 LAB
ALBUMIN SERPL-MCNC: 4.6 G/DL (ref 3.5–5.2)
ALBUMIN/GLOB SERPL: 1.8 G/DL
ALP SERPL-CCNC: 79 U/L (ref 39–117)
ALT SERPL W P-5'-P-CCNC: 40 U/L (ref 1–41)
ANION GAP SERPL CALCULATED.3IONS-SCNC: 10 MMOL/L (ref 5–15)
APTT PPP: 31.5 SECONDS (ref 24.5–36)
AST SERPL-CCNC: 22 U/L (ref 1–40)
BILIRUB SERPL-MCNC: 0.3 MG/DL (ref 0–1.2)
BUN SERPL-MCNC: 18 MG/DL (ref 6–20)
BUN/CREAT SERPL: 15 (ref 7–25)
CALCIUM SPEC-SCNC: 9.1 MG/DL (ref 8.6–10.5)
CHLORIDE SERPL-SCNC: 105 MMOL/L (ref 98–107)
CO2 SERPL-SCNC: 26 MMOL/L (ref 22–29)
CREAT SERPL-MCNC: 1.2 MG/DL (ref 0.76–1.27)
DEPRECATED RDW RBC AUTO: 38.3 FL (ref 37–54)
EGFRCR SERPLBLD CKD-EPI 2021: 82.9 ML/MIN/1.73
ERYTHROCYTE [DISTWIDTH] IN BLOOD BY AUTOMATED COUNT: 12.7 % (ref 12.3–15.4)
GLOBULIN UR ELPH-MCNC: 2.5 GM/DL
GLUCOSE SERPL-MCNC: 87 MG/DL (ref 65–99)
HCT VFR BLD AUTO: 39.1 % (ref 37.5–51)
HGB BLD-MCNC: 13.8 G/DL (ref 13–17.7)
INR PPP: 0.91 (ref 0.91–1.09)
MCH RBC QN AUTO: 29.1 PG (ref 26.6–33)
MCHC RBC AUTO-ENTMCNC: 35.3 G/DL (ref 31.5–35.7)
MCV RBC AUTO: 82.3 FL (ref 79–97)
PLATELET # BLD AUTO: 183 10*3/MM3 (ref 140–450)
PMV BLD AUTO: 10.5 FL (ref 6–12)
POTASSIUM SERPL-SCNC: 4.2 MMOL/L (ref 3.5–5.2)
PROT SERPL-MCNC: 7.1 G/DL (ref 6–8.5)
PROTHROMBIN TIME: 12.7 SECONDS (ref 11.8–14.8)
RBC # BLD AUTO: 4.75 10*6/MM3 (ref 4.14–5.8)
SODIUM SERPL-SCNC: 141 MMOL/L (ref 136–145)
WBC NRBC COR # BLD AUTO: 5.6 10*3/MM3 (ref 3.4–10.8)

## 2024-11-11 PROCEDURE — 85730 THROMBOPLASTIN TIME PARTIAL: CPT

## 2024-11-11 PROCEDURE — 71046 X-RAY EXAM CHEST 2 VIEWS: CPT

## 2024-11-11 PROCEDURE — 85610 PROTHROMBIN TIME: CPT

## 2024-11-11 PROCEDURE — 80053 COMPREHEN METABOLIC PANEL: CPT

## 2024-11-11 PROCEDURE — 85027 COMPLETE CBC AUTOMATED: CPT

## 2024-11-11 PROCEDURE — 93005 ELECTROCARDIOGRAM TRACING: CPT

## 2024-11-11 PROCEDURE — 36415 COLL VENOUS BLD VENIPUNCTURE: CPT

## 2024-11-11 NOTE — DISCHARGE INSTRUCTIONS

## 2024-11-12 LAB — QT INTERVAL: 408 MS

## 2024-11-18 ENCOUNTER — HOSPITAL ENCOUNTER (OUTPATIENT)
Facility: HOSPITAL | Age: 29
Setting detail: HOSPITAL OUTPATIENT SURGERY
Discharge: HOME | End: 2024-11-18
Attending: OTOLARYNGOLOGY | Admitting: NURSE PRACTITIONER
Payer: COMMERCIAL

## 2024-11-18 ENCOUNTER — ANESTHESIA (OUTPATIENT)
Dept: PERIOP | Facility: HOSPITAL | Age: 29
End: 2024-11-18
Payer: COMMERCIAL

## 2024-11-18 ENCOUNTER — ANESTHESIA EVENT (OUTPATIENT)
Dept: PERIOP | Facility: HOSPITAL | Age: 29
End: 2024-11-18
Payer: COMMERCIAL

## 2024-11-18 VITALS
SYSTOLIC BLOOD PRESSURE: 118 MMHG | OXYGEN SATURATION: 95 % | DIASTOLIC BLOOD PRESSURE: 85 MMHG | TEMPERATURE: 97.8 F | HEART RATE: 78 BPM | RESPIRATION RATE: 16 BRPM

## 2024-11-18 DIAGNOSIS — J32.9 RECURRENT SINUS INFECTIONS: ICD-10-CM

## 2024-11-18 DIAGNOSIS — R09.81 NASAL CONGESTION: ICD-10-CM

## 2024-11-18 DIAGNOSIS — J34.89 SINUS PAIN: ICD-10-CM

## 2024-11-18 DIAGNOSIS — J34.2 NASAL SEPTAL DEVIATION: ICD-10-CM

## 2024-11-18 DIAGNOSIS — J34.3 HYPERTROPHY OF BOTH INFERIOR NASAL TURBINATES: ICD-10-CM

## 2024-11-18 DIAGNOSIS — J32.9 CHRONIC RHINOSINUSITIS: ICD-10-CM

## 2024-11-18 PROCEDURE — 25010000002 LIDOCAINE PF 2% 2 % SOLUTION: Performed by: NURSE ANESTHETIST, CERTIFIED REGISTERED

## 2024-11-18 PROCEDURE — 88305 TISSUE EXAM BY PATHOLOGIST: CPT | Performed by: OTOLARYNGOLOGY

## 2024-11-18 PROCEDURE — 25010000002 LIDOCAINE 1% - EPINEPHRINE 1:100000 1 %-1:100000 SOLUTION: Performed by: OTOLARYNGOLOGY

## 2024-11-18 PROCEDURE — 88311 DECALCIFY TISSUE: CPT | Performed by: OTOLARYNGOLOGY

## 2024-11-18 PROCEDURE — 25010000002 ONDANSETRON PER 1 MG: Performed by: NURSE ANESTHETIST, CERTIFIED REGISTERED

## 2024-11-18 PROCEDURE — 25010000002 COCAINE HCL 40 MG/ML SOLUTION: Performed by: OTOLARYNGOLOGY

## 2024-11-18 PROCEDURE — 25010000002 FENTANYL CITRATE (PF) 100 MCG/2ML SOLUTION: Performed by: NURSE ANESTHETIST, CERTIFIED REGISTERED

## 2024-11-18 PROCEDURE — 30802 ABLATE INF TURBINATE SUBMUC: CPT | Performed by: OTOLARYNGOLOGY

## 2024-11-18 PROCEDURE — 61782 SCAN PROC CRANIAL EXTRA: CPT | Performed by: OTOLARYNGOLOGY

## 2024-11-18 PROCEDURE — C9046 COCAINE HCL NASAL SOLUTION: HCPCS | Performed by: OTOLARYNGOLOGY

## 2024-11-18 PROCEDURE — 25010000002 DEXAMETHASONE PER 1 MG: Performed by: NURSE ANESTHETIST, CERTIFIED REGISTERED

## 2024-11-18 PROCEDURE — C2625 STENT, NON-COR, TEM W/DEL SY: HCPCS | Performed by: OTOLARYNGOLOGY

## 2024-11-18 PROCEDURE — 25010000002 DEXAMETHASONE PER 1 MG: Performed by: NURSE PRACTITIONER

## 2024-11-18 PROCEDURE — 25010000002 PROPOFOL 10 MG/ML EMULSION: Performed by: NURSE ANESTHETIST, CERTIFIED REGISTERED

## 2024-11-18 PROCEDURE — 30520 REPAIR OF NASAL SEPTUM: CPT | Performed by: OTOLARYNGOLOGY

## 2024-11-18 PROCEDURE — 31256 EXPLORATION MAXILLARY SINUS: CPT | Performed by: OTOLARYNGOLOGY

## 2024-11-18 PROCEDURE — 25810000003 LACTATED RINGERS PER 1000 ML: Performed by: OTOLARYNGOLOGY

## 2024-11-18 PROCEDURE — 31254 NSL/SINS NDSC W/PRTL ETHMDCT: CPT | Performed by: OTOLARYNGOLOGY

## 2024-11-18 DEVICE — PROPEL SINUS IMPLANT
Type: IMPLANTABLE DEVICE | Site: NOSE | Status: FUNCTIONAL
Brand: PROPEL

## 2024-11-18 DEVICE — STPLR SEPTL ENTACT .5X3MM: Type: IMPLANTABLE DEVICE | Site: NOSE | Status: FUNCTIONAL

## 2024-11-18 RX ORDER — DEXAMETHASONE SODIUM PHOSPHATE 10 MG/ML
8 INJECTION INTRAMUSCULAR; INTRAVENOUS
Status: COMPLETED | OUTPATIENT
Start: 2024-11-18 | End: 2024-11-18

## 2024-11-18 RX ORDER — AMOXICILLIN 500 MG/1
500 CAPSULE ORAL 3 TIMES DAILY
Qty: 30 CAPSULE | Refills: 0 | Status: SHIPPED | OUTPATIENT
Start: 2024-11-18 | End: 2024-11-28

## 2024-11-18 RX ORDER — SODIUM CHLORIDE 9 MG/ML
40 INJECTION, SOLUTION INTRAVENOUS AS NEEDED
Status: DISCONTINUED | OUTPATIENT
Start: 2024-11-18 | End: 2024-11-18 | Stop reason: HOSPADM

## 2024-11-18 RX ORDER — FENTANYL CITRATE 50 UG/ML
INJECTION, SOLUTION INTRAMUSCULAR; INTRAVENOUS AS NEEDED
Status: DISCONTINUED | OUTPATIENT
Start: 2024-11-18 | End: 2024-11-18 | Stop reason: SURG

## 2024-11-18 RX ORDER — HYDROCODONE BITARTRATE AND ACETAMINOPHEN 5; 325 MG/1; MG/1
1 TABLET ORAL EVERY 8 HOURS PRN
Qty: 8 TABLET | Refills: 0 | Status: SHIPPED | OUTPATIENT
Start: 2024-11-18

## 2024-11-18 RX ORDER — OXYMETAZOLINE HYDROCHLORIDE 0.05 G/100ML
2 SPRAY NASAL
Status: COMPLETED | OUTPATIENT
Start: 2024-11-18 | End: 2024-11-18

## 2024-11-18 RX ORDER — MIDAZOLAM HYDROCHLORIDE 2 MG/2ML
1 INJECTION, SOLUTION INTRAMUSCULAR; INTRAVENOUS
Status: DISCONTINUED | OUTPATIENT
Start: 2024-11-18 | End: 2024-11-18 | Stop reason: HOSPADM

## 2024-11-18 RX ORDER — COCAINE HYDROCHLORIDE 40 MG/ML
SOLUTION NASAL AS NEEDED
Status: DISCONTINUED | OUTPATIENT
Start: 2024-11-18 | End: 2024-11-18 | Stop reason: HOSPADM

## 2024-11-18 RX ORDER — FENTANYL CITRATE 50 UG/ML
50 INJECTION, SOLUTION INTRAMUSCULAR; INTRAVENOUS
Status: DISCONTINUED | OUTPATIENT
Start: 2024-11-18 | End: 2024-11-18 | Stop reason: HOSPADM

## 2024-11-18 RX ORDER — LIDOCAINE HYDROCHLORIDE 20 MG/ML
INJECTION, SOLUTION EPIDURAL; INFILTRATION; INTRACAUDAL; PERINEURAL AS NEEDED
Status: DISCONTINUED | OUTPATIENT
Start: 2024-11-18 | End: 2024-11-18 | Stop reason: SURG

## 2024-11-18 RX ORDER — COCAINE HYDROCHLORIDE 40 MG/ML
SOLUTION NASAL
Status: DISCONTINUED
Start: 2024-11-18 | End: 2024-11-18 | Stop reason: HOSPADM

## 2024-11-18 RX ORDER — SODIUM CHLORIDE, SODIUM LACTATE, POTASSIUM CHLORIDE, CALCIUM CHLORIDE 600; 310; 30; 20 MG/100ML; MG/100ML; MG/100ML; MG/100ML
INJECTION, SOLUTION INTRAVENOUS CONTINUOUS PRN
Status: COMPLETED | OUTPATIENT
Start: 2024-11-18 | End: 2024-11-18

## 2024-11-18 RX ORDER — SODIUM CHLORIDE 0.9 % (FLUSH) 0.9 %
3-10 SYRINGE (ML) INJECTION AS NEEDED
Status: DISCONTINUED | OUTPATIENT
Start: 2024-11-18 | End: 2024-11-18 | Stop reason: HOSPADM

## 2024-11-18 RX ORDER — SODIUM CHLORIDE 0.9 % (FLUSH) 0.9 %
3 SYRINGE (ML) INJECTION EVERY 12 HOURS SCHEDULED
Status: DISCONTINUED | OUTPATIENT
Start: 2024-11-18 | End: 2024-11-18 | Stop reason: HOSPADM

## 2024-11-18 RX ORDER — IBUPROFEN 600 MG/1
600 TABLET, FILM COATED ORAL EVERY 6 HOURS PRN
Status: DISCONTINUED | OUTPATIENT
Start: 2024-11-18 | End: 2024-11-18 | Stop reason: HOSPADM

## 2024-11-18 RX ORDER — FLUMAZENIL 0.1 MG/ML
0.2 INJECTION INTRAVENOUS AS NEEDED
Status: DISCONTINUED | OUTPATIENT
Start: 2024-11-18 | End: 2024-11-18 | Stop reason: HOSPADM

## 2024-11-18 RX ORDER — HYDROCODONE BITARTRATE AND ACETAMINOPHEN 5; 325 MG/1; MG/1
1 TABLET ORAL EVERY 4 HOURS PRN
Status: DISCONTINUED | OUTPATIENT
Start: 2024-11-18 | End: 2024-11-18 | Stop reason: HOSPADM

## 2024-11-18 RX ORDER — WATER 10 ML/10ML
INJECTION INTRAMUSCULAR; INTRAVENOUS; SUBCUTANEOUS AS NEEDED
Status: DISCONTINUED | OUTPATIENT
Start: 2024-11-18 | End: 2024-11-18 | Stop reason: HOSPADM

## 2024-11-18 RX ORDER — ROCURONIUM BROMIDE 10 MG/ML
INJECTION, SOLUTION INTRAVENOUS AS NEEDED
Status: DISCONTINUED | OUTPATIENT
Start: 2024-11-18 | End: 2024-11-18 | Stop reason: SURG

## 2024-11-18 RX ORDER — SUCCINYLCHOLINE/SOD CL,ISO/PF 200MG/10ML
SYRINGE (ML) INTRAVENOUS AS NEEDED
Status: DISCONTINUED | OUTPATIENT
Start: 2024-11-18 | End: 2024-11-18 | Stop reason: SURG

## 2024-11-18 RX ORDER — ONDANSETRON 2 MG/ML
4 INJECTION INTRAMUSCULAR; INTRAVENOUS ONCE AS NEEDED
Status: DISCONTINUED | OUTPATIENT
Start: 2024-11-18 | End: 2024-11-18 | Stop reason: HOSPADM

## 2024-11-18 RX ORDER — ONDANSETRON 2 MG/ML
INJECTION INTRAMUSCULAR; INTRAVENOUS AS NEEDED
Status: DISCONTINUED | OUTPATIENT
Start: 2024-11-18 | End: 2024-11-18 | Stop reason: SURG

## 2024-11-18 RX ORDER — SODIUM CHLORIDE 0.9 % (FLUSH) 0.9 %
3 SYRINGE (ML) INJECTION AS NEEDED
Status: DISCONTINUED | OUTPATIENT
Start: 2024-11-18 | End: 2024-11-18 | Stop reason: HOSPADM

## 2024-11-18 RX ORDER — DEXAMETHASONE SODIUM PHOSPHATE 4 MG/ML
INJECTION, SOLUTION INTRA-ARTICULAR; INTRALESIONAL; INTRAMUSCULAR; INTRAVENOUS; SOFT TISSUE AS NEEDED
Status: DISCONTINUED | OUTPATIENT
Start: 2024-11-18 | End: 2024-11-18 | Stop reason: SURG

## 2024-11-18 RX ORDER — LIDOCAINE HYDROCHLORIDE AND EPINEPHRINE 10; 10 MG/ML; UG/ML
INJECTION, SOLUTION INFILTRATION; PERINEURAL AS NEEDED
Status: DISCONTINUED | OUTPATIENT
Start: 2024-11-18 | End: 2024-11-18 | Stop reason: HOSPADM

## 2024-11-18 RX ORDER — SODIUM CHLORIDE, SODIUM LACTATE, POTASSIUM CHLORIDE, CALCIUM CHLORIDE 600; 310; 30; 20 MG/100ML; MG/100ML; MG/100ML; MG/100ML
100 INJECTION, SOLUTION INTRAVENOUS CONTINUOUS
Status: DISCONTINUED | OUTPATIENT
Start: 2024-11-18 | End: 2024-11-18 | Stop reason: HOSPADM

## 2024-11-18 RX ORDER — ACETAMINOPHEN 500 MG
1000 TABLET ORAL ONCE
Status: COMPLETED | OUTPATIENT
Start: 2024-11-18 | End: 2024-11-18

## 2024-11-18 RX ORDER — LABETALOL HYDROCHLORIDE 5 MG/ML
5 INJECTION, SOLUTION INTRAVENOUS
Status: DISCONTINUED | OUTPATIENT
Start: 2024-11-18 | End: 2024-11-18 | Stop reason: HOSPADM

## 2024-11-18 RX ORDER — NALOXONE HCL 0.4 MG/ML
0.4 VIAL (ML) INJECTION AS NEEDED
Status: DISCONTINUED | OUTPATIENT
Start: 2024-11-18 | End: 2024-11-18 | Stop reason: HOSPADM

## 2024-11-18 RX ORDER — SODIUM CHLORIDE, SODIUM LACTATE, POTASSIUM CHLORIDE, CALCIUM CHLORIDE 600; 310; 30; 20 MG/100ML; MG/100ML; MG/100ML; MG/100ML
1000 INJECTION, SOLUTION INTRAVENOUS CONTINUOUS
Status: DISCONTINUED | OUTPATIENT
Start: 2024-11-18 | End: 2024-11-18 | Stop reason: HOSPADM

## 2024-11-18 RX ORDER — HYDROCODONE BITARTRATE AND ACETAMINOPHEN 10; 325 MG/1; MG/1
1 TABLET ORAL EVERY 4 HOURS PRN
Status: DISCONTINUED | OUTPATIENT
Start: 2024-11-18 | End: 2024-11-18 | Stop reason: HOSPADM

## 2024-11-18 RX ORDER — HYDROCODONE BITARTRATE AND ACETAMINOPHEN 5; 325 MG/1; MG/1
1 TABLET ORAL ONCE AS NEEDED
Status: DISCONTINUED | OUTPATIENT
Start: 2024-11-18 | End: 2024-11-18 | Stop reason: HOSPADM

## 2024-11-18 RX ORDER — SODIUM CHLORIDE/ALOE VERA
GEL (GRAM) NASAL AS NEEDED
Status: DISCONTINUED | OUTPATIENT
Start: 2024-11-18 | End: 2024-11-18 | Stop reason: HOSPADM

## 2024-11-18 RX ORDER — PROPOFOL 10 MG/ML
VIAL (ML) INTRAVENOUS AS NEEDED
Status: DISCONTINUED | OUTPATIENT
Start: 2024-11-18 | End: 2024-11-18 | Stop reason: SURG

## 2024-11-18 RX ADMIN — ACETAMINOPHEN 1000 MG: 500 TABLET, FILM COATED ORAL at 07:24

## 2024-11-18 RX ADMIN — PROPOFOL 200 MG: 10 INJECTION, EMULSION INTRAVENOUS at 08:30

## 2024-11-18 RX ADMIN — FENTANYL CITRATE 100 MCG: 50 INJECTION, SOLUTION INTRAMUSCULAR; INTRAVENOUS at 08:30

## 2024-11-18 RX ADMIN — Medication 130 MG: at 08:30

## 2024-11-18 RX ADMIN — LIDOCAINE HYDROCHLORIDE 100 MG: 20 INJECTION, SOLUTION EPIDURAL; INFILTRATION; INTRACAUDAL; PERINEURAL at 08:30

## 2024-11-18 RX ADMIN — ONDANSETRON 4 MG: 2 INJECTION INTRAMUSCULAR; INTRAVENOUS at 09:54

## 2024-11-18 RX ADMIN — DEXAMETHASONE SODIUM PHOSPHATE 8 MG: 4 INJECTION, SOLUTION INTRA-ARTICULAR; INTRALESIONAL; INTRAMUSCULAR; INTRAVENOUS; SOFT TISSUE at 08:35

## 2024-11-18 RX ADMIN — Medication 2 SPRAY: at 07:20

## 2024-11-18 RX ADMIN — Medication 2 SPRAY: at 07:45

## 2024-11-18 RX ADMIN — SODIUM CHLORIDE, POTASSIUM CHLORIDE, SODIUM LACTATE AND CALCIUM CHLORIDE 1000 ML: 600; 310; 30; 20 INJECTION, SOLUTION INTRAVENOUS at 07:22

## 2024-11-18 RX ADMIN — HYDROCODONE BITARTRATE AND ACETAMINOPHEN 1 TABLET: 5; 325 TABLET ORAL at 10:35

## 2024-11-18 RX ADMIN — Medication 2 SPRAY: at 07:36

## 2024-11-18 RX ADMIN — DEXAMETHASONE SODIUM PHOSPHATE 8 MG: 10 INJECTION INTRAMUSCULAR; INTRAVENOUS at 07:20

## 2024-11-18 RX ADMIN — ROCURONIUM BROMIDE 10 MG: 10 INJECTION, SOLUTION INTRAVENOUS at 08:30

## 2024-11-18 NOTE — H&P
YOB: 1995  Location: [facility]  Location Address: [address] [place] [address],  [address], [address] [place] [address]  Location Phone: [phone]          Chief Complaint   Patient presents with    Sinus Problem       F/u            History of Present Illness  Chavo Potts is a 29 y.o. male.  Chavo Potts is here for follow up of ENT complaints. The patient has had problems with congestion, difficulty breathing through the nose, postnasal drainage, rhinorrhea, recurrent sinusitis, and sinus pain and pressure.  Symptoms are worse on the left side.  He has been using Astelin and Flonase as directed.  He has had a CT sinus.     Reviewed:    Study Result     Narrative & Impression   CT SINUS WO CONTRAST- 2024 9:34 AM     HISTORY: Nasal septal deviation; J34.2-Deviated nasal septum;  R09.81-Nasal congestion; J34.89-Other specified disorders of nose and  nasal sinuses; J32.9-Chronic sinusitis, unspecified     COMPARISON: NONE.      DOSE LENGTH PRODUCT: 411.14 mGy.cm. Automated exposure control was also  utilized to decrease patient radiation dose.     TECHNIQUE: Helical tomographic images of the sinuses were obtained  without contrast. Multiplanar reformatted images were provided for  review.     FINDINGS:     Bilateral maxillary sinus mucus retention cysts, large on the left and  also opacifying a portion of the infundibulum. No sinus expansion.  Frontal sinuses and anterior ethmoids are clear, as are the frontal  recesses. Chronic mucosal thickening in the left sphenoid sinus. [place]  left septal bowing with large left-sided spur which is impacted on the  left inferior turbinate. Right middle turbinate paradoxical curvature.  Anterior skull base is intact. Facial bones are intact. Normal alignment  at the TMJs. Mastoids are clear. Visualized intracranial contents are  unremarkable. No inflammatory changes in the deep neck. Nasopharyngeal  airway is unremarkable.       IMPRESSION:  1. Bilateral maxillary sinus mucus  retention cysts, larger on the left  and mostly opacifying the sinus. No sinus expansion to suggest mucocele.  2. Additional chronic mucosal thickening in the left sphenoid sinus. No  acute sinusitis.  3. [place] left septal bowing with large left-sided spur, impacted on the  left inferior turbinate.              This report was signed and finalized on 8/13/2024 11:03 AM by Dr Jh Bradley.            Medical History   History reviewed. No pertinent past medical history.        Surgical History         Past Surgical History:   Procedure Laterality Date    BONE GRAFT        KNEE SURGERY                Medications Taking          Outpatient Medications Marked as Taking for the 9/10/24 encounter (Office Visit) with Nathanael Duran MD   Medication Sig Dispense Refill    azelastine (ASTELIN) 0.1 % nasal spray 2 sprays into the nostril(s) as directed by provider 2 (Two) Times a Day. Use in each nostril as directed 30 mL 11    fexofenadine-pseudoephedrine (ALLEGRA-D 24) 180-240 MG per 24 hr tablet Take 1 tablet by mouth Daily.        fluticasone (FLONASE) 50 MCG/ACT nasal spray 2 sprays into the nostril(s) as directed by provider Daily. 16 g 11    montelukast (SINGULAIR) 10 MG tablet Take 1 tablet by mouth.                Cefprozil           Family History   Problem Relation Age of Onset    Cancer Father           Social History   Social History           Socioeconomic History    Marital status:    Tobacco Use    Smoking status: Never    Smokeless tobacco: Never   Vaping Use    Vaping status: Never Used   Substance and Sexual Activity    Alcohol use: No    Drug use: No    Sexual activity: Defer            Review of Systems   Constitutional: Negative.    HENT:  Positive for congestion, sinus pressure and sinus pain.              Vitals:     09/10/24 1456   BP: 145/89   Pulse: 86   Temp: 98.6 °F (37 °C)         Body mass index is 28.88 kg/m².        Objective  Physical Exam  Vitals reviewed.   Constitutional:        Appearance: Normal appearance. He is obese.   HENT:      Head: Normocephalic.      Right Ear: Tympanic membrane, ear canal and external ear normal.      Left Ear: Tympanic membrane, ear canal and external ear normal.      Nose: Septal deviation present.      Right Turbinates: Enlarged.      Left Turbinates: Enlarged.      Comments: Significant septal deviation noted     See endoscopy     Mouth/Throat:      Lips: Pink.      Mouth: Mucous membranes are moist.      Pharynx: Oropharynx is clear.   Musculoskeletal:      Cervical back: Full passive range of motion without pain.   Neurological:      Mental Status: He is alert.   Psychiatric:         Behavior: Behavior is cooperative.               Assessment & Plan  Diagnoses and all orders for this visit:     1. Chronic rhinosinusitis (Primary)  -     Case Request; Standing  -     Comprehensive Metabolic Panel; Future  -     CBC (No Diff); Future  -     Protime-INR; Future  -     APTT; Future  -     ECG 12 Lead; Future  -     XR Chest 2 View; Future  -     oxymetazoline (AFRIN) nasal spray 2 spray  -     dexAMETHasone (DECADRON) 8 mg in sodium chloride 0.9 % IVPB  -     Case Request     2. Nasal septal deviation  -     Case Request; Standing  -     Comprehensive Metabolic Panel; Future  -     CBC (No Diff); Future  -     Protime-INR; Future  -     APTT; Future  -     ECG 12 Lead; Future  -     XR Chest 2 View; Future  -     oxymetazoline (AFRIN) nasal spray 2 spray  -     dexAMETHasone (DECADRON) 8 mg in sodium chloride 0.9 % IVPB  -     Case Request     3. Hypertrophy of both inferior nasal turbinates  -     Case Request; Standing  -     Comprehensive Metabolic Panel; Future  -     CBC (No Diff); Future  -     Protime-INR; Future  -     APTT; Future  -     ECG 12 Lead; Future  -     XR Chest 2 View; Future  -     oxymetazoline (AFRIN) nasal spray 2 spray  -     dexAMETHasone (DECADRON) 8 mg in sodium chloride 0.9 % IVPB  -     Case Request     4. Nasal congestion  -      Case Request; Standing  -     Comprehensive Metabolic Panel; Future  -     CBC (No Diff); Future  -     Protime-INR; Future  -     APTT; Future  -     ECG 12 Lead; Future  -     XR Chest 2 View; Future  -     oxymetazoline (AFRIN) nasal spray 2 spray  -     dexAMETHasone (DECADRON) 8 mg in sodium chloride 0.9 % IVPB  -     Case Request     5. Sinus pain  -     Case Request; Standing  -     Comprehensive Metabolic Panel; Future  -     CBC (No Diff); Future  -     Protime-INR; Future  -     APTT; Future  -     ECG 12 Lead; Future  -     XR Chest 2 View; Future  -     oxymetazoline (AFRIN) nasal spray 2 spray  -     dexAMETHasone (DECADRON) 8 mg in sodium chloride 0.9 % IVPB  -     Case Request     6. Recurrent sinus infections  -     Case Request; Standing  -     Comprehensive Metabolic Panel; Future  -     CBC (No Diff); Future  -     Protime-INR; Future  -     APTT; Future  -     ECG 12 Lead; Future  -     XR Chest 2 View; Future  -     oxymetazoline (AFRIN) nasal spray 2 spray  -     dexAMETHasone (DECADRON) 8 mg in sodium chloride 0.9 % IVPB  -     Case Request     Other orders  -     Follow Anesthesia Guidelines / Protocol; Future  -     Obtain Informed Consent  -     Follow Anesthesia Guidelines / Protocol; Standing  -     Verify NPO Status; Standing        BILATERAL ENDOSCOPIC FUNCTIONAL SINUS SURGERY W TRACKING, SINUPLASTY, SEPTOPLASTY, RESECT INFERIOR TURBINATES VIA COBLATION (Bilateral)        Orders Placed This Encounter   Procedures    XR Chest 2 View       Standing Status:   Future       Standing Expiration Date:   9/10/2025       Order Specific Question:   Reason for Exam:       Answer:   Preoperative       Order Specific Question:   Release to patient       Answer:   Routine Release [[id number]]    Comprehensive Metabolic Panel       Standing Status:   Future       Standing Expiration Date:   9/10/2025       Order Specific Question:   Release to patient       Answer:   Routine Release [[id number]]     CBC (No Diff)       Standing Status:   Future       Standing Expiration Date:   9/10/2025       Order Specific Question:   Release to patient       Answer:   Routine Release [[id number]]    Protime-INR       Standing Status:   Future       Standing Expiration Date:   9/10/2025       Order Specific Question:   Release to patient       Answer:   Routine Release [[id number]]    APTT       Standing Status:   Future       Standing Expiration Date:   9/10/2025       Order Specific Question:   Release to patient       Answer:   Routine Release [[id number]]    Obtain Informed Consent       Order Specific Question:   Informed Consent Given For       Answer:   BILATERAL ENDOSCOPIC FUNCTIONAL SINUS SURGERY W TRACKING, SINUPLASTY, SEPTOPLASTY, RESECT INFERIOR TURBINATES VIA COBLATION    ECG 12 Lead       Standing Status:   Future       Standing Expiration Date:   9/10/2025       Order Specific Question:   Reason for Exam:       Answer:   preop       Order Specific Question:   Release to patient       Answer:   Routine Release [[id number]]      SEPTOPLASTY AND TURBINOPLASTY: A septoplasty and inferior turbinoplasty were recommended. The risks and benefits were explained including but not limited to pain, bleeding, infection, risks of the general anesthesia, continued septal deviation, crusting, congestion and septal perforation. Possibilities of continued preoperative symptoms and the possible need for revision surgery and or medical therapy were discussed. Alternatives were discussed. No guarantees were made or implied. Questions were asked appropriately answered.        FUNCTIONAL ENDOSCOPIC SINUS SURGERY: A functional endoscopic sinus surgery was recommended. The risks and benefits were explained including but not limited to pain, bleeding (with the possible need for nasal packing), infection, risks of the general anesthesia, orbital injury with blurred vision or visual loss, cerebrospinal fluid leak, persistent disease,  scarring, synichiae and the possibility for the need of reoperation. Possibilities of additional sinus work or less sinus work depending on the status of the nose at the time of the operation was discussed. Alternatives were discussed. No guarantees were made or implied. Questions were asked appropriately answered.        Dr. Duran examined and discussed care with patient and agrees with treatment plan.       Return for post op.           Patient Instructions   SEPTOPLASTY AND TURBINOPLASTY: A septoplasty and inferior turbinoplasty were recommended. The risks and benefits were explained including but not limited to pain, bleeding, infection, risks of the general anesthesia, continued septal deviation, crusting, congestion and septal perforation. Possibilities of continued preoperative symptoms and the possible need for revision surgery and or medical therapy were discussed. Alternatives were discussed. No guarantees were made or implied. Questions were asked appropriately answered.        FUNCTIONAL ENDOSCOPIC SINUS SURGERY: A functional endoscopic sinus surgery was recommended. The risks and benefits were explained including but not limited to pain, bleeding (with the possible need for nasal packing), infection, risks of the general anesthesia, orbital injury with blurred vision or visual loss, cerebrospinal fluid leak, persistent disease, scarring, synichiae and the possibility for the need of reoperation. Possibilities of additional sinus work or less sinus work depending on the status of the nose at the time of the operation was discussed. Alternatives were discussed. No guarantees were made or implied. Questions were asked appropriately answered.

## 2024-11-18 NOTE — OP NOTE
OPERATIVE NOTE  11/18/2024    NAME: Chavo Potts    YOB: 1995  MRN: [id number]    PRE-OPERATIVE DIAGNOSIS:    Chronic rhinosinusitis [J32.9]  Nasal septal deviation [J34.2]  Hypertrophy of both inferior nasal turbinates [J34.3]  Nasal congestion [R09.81]  Sinus pain [J34.89]  Recurrent sinus infections [J32.9]    POST-OPERATIVE DIAGNOSIS:   Post-Op Diagnosis Codes:     * Chronic rhinosinusitis [J32.9]     * Nasal septal deviation [J34.2]     * Hypertrophy of both inferior nasal turbinates [J34.3]     * Nasal congestion [R09.81]     * Sinus pain [J34.89]     * Recurrent sinus infections [J32.9]    PROCEDURE PERFORMED:   Bilateral functional endoscopic anterior ethmoidectomy with bilateral middle meatal antrostomy  Septoplasty  Bilateral inferior turbinate reduction via Coblation    SURGEON:   Nathanael Duran MD    ASSISTANT(S):   None    ANESTHESIA:   General Anesthesia via Endotracheal Tube    INDICATIONS: The patient is a 29 y.o. male with Chronic rhinosinusitis [J32.9]  Nasal septal deviation [J34.2]  Hypertrophy of both inferior nasal turbinates [J34.3]  Nasal congestion [R09.81]  Sinus pain [J34.89]  Recurrent sinus infections [J32.9]    PROCEDURE:  The patient was brought to the operating room, given General Anesthesia via Endotracheal Tube, and prepped and draped in the usual manner.     Image guidance with the Saqina image guidance sinus surgery system following registration was utilized during the procedure.     Approximately 4 mL of 4% cocaine solution impregnating Codman neurosurgical pledgets were placed intranasally and 5 minutes allowed to pass by the clock.  The pledgets were removed and a hemitransfixion incision accomplished 12 mm cephalad to the caudal margin of the quadrangular cartilage.  Septal flaps were elevated bilaterally consisting of the mucoperichondrium.  The deviated portion of quadrangular cartilage was resected with care taken to leave 10 mm of quadrangular  "cartilage for dorsal support.  The dissection progressed posteriorly to the perpendicular plate of the ethmoid bone and a cartilaginous spur was removed with Phillip forceps and a [place] elevator on the left.  Subsequently the caudal incision was closed with interrupted 4-0 plain gut and the ENTrMention Mobilee surgical stapler utilized to place \"whip\" stitches.    Rigid nasal endoscopy was subsequently performed with the Mapplas 0° endoscope.  On the left the middle turbinate was subluxed medially utilizing a [place] elevator and injection accomplished with 1 mL 1% Xylocaine with epinephrine.  The turbinate was transected from its lateral nasal wall attachments utilizing Bellucci scissors and further removed with Phillip forceps and the ground lamella attachments resected utilizing Elmer-Cut forceps.  No significant bleeding was encountered at the ground lamella and the little bleeding which was encountered was controlled with the Weck suction cautery.    An anterior ethmoidectomy was performed with the RAD-12microdebrider.  The uncinate process remnant was removed after the natural opening of the maxillary sinus was identified with the blunt ostium seeker.  The ethmoid bulla was infractured and unroofed utilizing the microdebrider.  No intranasal polyposis was appreciated.  The frontal recess appeared patent and so no dissection was performed in this area.  The dissection progressed posteriorly to the anterior portion of the posterior ethmoids.  No true polypoid changes were noted throughout the dissection.  Edematous mucosa was noted but no antral polyposis was appreciated    Inferior turbinate reduction was accomplished with the Coblator through a single penetration via the anterior portion of the inferior turbinate  intramurally or deep into the mucosa and 3 lesions were subsequently created with the Coblator as the Coblator wand was gently withdrawn following full insertion.  No significant bleeding was encountered.    A " Regular Propel implant was placed near the antrostomy.   Staberger sinus foam dressing was also placed.  No significant bleeding was noted.   Attention was then turned to the opposite side where a similar procedure was performed with similar findings.    The patient was transported upon extubation to the postanesthesia care unit in stable condition.    SPECIMENS:  Cartilage and bone with ethmoid bone and mucosa    COMPLICATIONS: NONE    ESTIMATED BLOOD LOSS:  < 25 cc           Nathanael Duran MD  11/18/2024

## 2024-11-18 NOTE — ANESTHESIA POSTPROCEDURE EVALUATION
Patient: Chavo Potts    Procedure Summary       Date: 11/18/24 Room / Location:  [facility]  /  [facility]    Anesthesia Start: 0826 Anesthesia Stop: 1005    Procedure: BILATERAL ENDOSCOPIC FUNCTIONAL SINUS SURGERY W TRACKING, SINUPLASTY, SEPTOPLASTY, RESECT INFERIOR TURBINATES VIA COBLATION (Bilateral: Nose) Diagnosis:       Chronic rhinosinusitis      Nasal septal deviation      Hypertrophy of both inferior nasal turbinates      Nasal congestion      Sinus pain      Recurrent sinus infections      (Chronic rhinosinusitis [J32.9])      (Nasal septal deviation [J34.2])      (Hypertrophy of both inferior nasal turbinates [J34.3])      (Nasal congestion [R09.81])      (Sinus pain [J34.89])      (Recurrent sinus infections [J32.9])    Surgeons: Nathanael Duran MD Provider: Davonte Conteh CRNA    Anesthesia Type: general ASA Status: 2            Anesthesia Type: general    Vitals  Vitals Value Taken Time   /82 11/18/24 1048   Temp 97.8 °F (36.6 °C) 11/18/24 1035   Pulse 68 11/18/24 1055   Resp 15 11/18/24 1035   SpO2 95 % 11/18/24 1055   Vitals shown include unfiled device data.        Post Anesthesia Care and Evaluation    Patient location during evaluation: PACU  Patient participation: complete - patient participated  Level of consciousness: awake and alert  Pain management: adequate    Airway patency: patent  Anesthetic complications: No anesthetic complications    Cardiovascular status: acceptable  Respiratory status: acceptable  Hydration status: acceptable    Comments: Blood pressure 117/71, pulse 82, temperature 97.8 °F (36.6 °C), temperature source Temporal, resp. rate 15, SpO2 94%.    Pt discharged from PACU based on mary score >8

## 2024-11-18 NOTE — ANESTHESIA PROCEDURE NOTES
Airway  Urgency: elective    Date/Time: 11/18/2024 8:31 AM  Airway not difficult    General Information and Staff    Patient location during procedure: OR  CRNA/CAA: Davonte Conteh CRNA    Indications and Patient Condition  Indications for airway management: airway protection    Preoxygenated: yes  Mask difficulty assessment: 1 - vent by mask    Final Airway Details  Final airway type: endotracheal airway      Successful airway: ETT  Cuffed: yes   Successful intubation technique: direct laryngoscopy  Facilitating devices/methods: intubating stylet  Endotracheal tube insertion site: oral  Blade: Mccoy  Blade size: 2  ETT size (mm): 7.0  Cormack-Lehane Classification: grade IIa - partial view of glottis  Placement verified by: capnometry   Cuff volume (mL): 7  Measured from: lips  ETT/EBT  to lips (cm): 22  Number of attempts at approach: 1  Assessment: lips, teeth, and gum same as pre-op and atraumatic intubation

## 2024-11-18 NOTE — ANESTHESIA PREPROCEDURE EVALUATION
Anesthesia Evaluation     no history of anesthetic complications:   NPO Solid Status: > 8 hours  NPO Liquid Status: > 8 hours           Airway   Mallampati: I  No difficulty expected  Dental      Pulmonary    (-) sleep apnea, not a smoker  Cardiovascular   Exercise tolerance: good (4-7 METS)    (-) hypertension, CAD      Neuro/Psych  (-) seizures, TIA, CVA  GI/Hepatic/Renal/Endo    (+) obesity  (-) liver disease, no renal disease, diabetes    Musculoskeletal     Abdominal    Substance History      OB/GYN          Other                    Anesthesia Plan    ASA 2     general     intravenous induction     Anesthetic plan, risks, benefits, and alternatives have been provided, discussed and informed consent has been obtained with: patient.    CODE STATUS:

## 2024-11-19 LAB
LAB AP CASE REPORT: NORMAL
Lab: NORMAL
PATH REPORT.FINAL DX SPEC: NORMAL
PATH REPORT.GROSS SPEC: NORMAL

## 2024-12-02 NOTE — PROGRESS NOTES
YOB: 1995  Location: [facility]  Location Address: [address] [place] [address], [facility] [address], [address] [place] [address]  Location Phone: [phone]    No chief complaint on file.      History of Present Illness  Chavo Potts is a 29 y.o. male.  Chavo Potts is status post Bilateral functional endoscopic anterior ethmoidectomy with bilateral middle meatal antrostomy, Septoplasty, and Bilateral inferior turbinate reduction via Coblation on 2024. Patient is having nasal congestion, bloody, yellow drainage, postnasal drip, and maxillary and ethmoid sinus pressure. Patient has had mild ear fullness and sore throat that has resolved. He has had significant improvement over pre-op status.      No past medical history on file.    Past Surgical History:   Procedure Laterality Date    BONE GRAFT      ENDOSCOPIC FUNCTIONAL SINUS SURGERY (FESS) Bilateral 2024    Procedure: BILATERAL ENDOSCOPIC FUNCTIONAL SINUS SURGERY W TRACKING, SINUPLASTY, SEPTOPLASTY, RESECT INFERIOR TURBINATES VIA COBLATION;  Surgeon: Nathanael Duran MD;  Location: [facility];  Service: ENT;  Laterality: Bilateral;    KNEE SURGERY Left        No outpatient medications have been marked as taking for the 12/3/24 encounter (Appointment) with Nathanael Duran MD.       Cefprozil    Family History   Problem Relation Age of Onset    Cancer Father        Social History     Socioeconomic History    Marital status:    Tobacco Use    Smoking status: Never    Smokeless tobacco: Never   Vaping Use    Vaping status: Never Used   Substance and Sexual Activity    Alcohol use: No    Drug use: No    Sexual activity: Defer       Review of Systems   Constitutional: Negative.    HENT:  Positive for congestion, nosebleeds, postnasal drip, rhinorrhea and sinus pressure.    Eyes: Negative.    Respiratory: Negative.     Cardiovascular: Negative.    Gastrointestinal: Negative.    Endocrine: Negative.    Genitourinary: Negative.    Musculoskeletal: Negative.     Skin: Negative.    Allergic/Immunologic: Negative.    Neurological: Negative.    Hematological: Negative.    Psychiatric/Behavioral: Negative.         There were no vitals filed for this visit.    There is no height or weight on file to calculate BMI.    Objective     Physical Exam  Constitutional:       Appearance: Normal appearance.   HENT:      Head: Normocephalic and atraumatic.      Right Ear: Tympanic membrane, ear canal and external ear normal.      Left Ear: Tympanic membrane, ear canal and external ear normal.      Nose: Congestion and rhinorrhea present. Rhinorrhea is bloody.      Comments: Debris of bilateral nasal cavities and with propel stent pieces noted.      Mouth/Throat:      Mouth: Mucous membranes are moist.      Pharynx: Posterior oropharyngeal erythema and postnasal drip present.   Musculoskeletal:      Cervical back: Normal range of motion and neck supple.   Neurological:      Mental Status: He is alert.       Assessment & Plan     * Surgery not found *  No orders of the defined types were placed in this encounter.    No follow-ups on file.       There are no Patient Instructions on file for this visit.

## 2024-12-03 ENCOUNTER — OFFICE VISIT (OUTPATIENT)
Dept: OTOLARYNGOLOGY | Facility: CLINIC | Age: 29
End: 2024-12-03
Payer: COMMERCIAL

## 2024-12-03 VITALS
HEIGHT: 71 IN | SYSTOLIC BLOOD PRESSURE: 153 MMHG | DIASTOLIC BLOOD PRESSURE: 90 MMHG | WEIGHT: 221.78 LBS | TEMPERATURE: 97.6 F | HEART RATE: 75 BPM | BODY MASS INDEX: 31.05 KG/M2

## 2024-12-03 DIAGNOSIS — J32.9 CHRONIC RHINOSINUSITIS: Primary | ICD-10-CM

## 2024-12-03 DIAGNOSIS — Z98.890 S/P FESS (FUNCTIONAL ENDOSCOPIC SINUS SURGERY): ICD-10-CM

## 2024-12-03 NOTE — PROGRESS NOTES
Procedure Note    Chavo Potts    DATE OF PROCEDURE: 12/3/2024    PROCEDURE:   Bilateral Rigid Nasal Endoscopy with debridement     PREPROCEDURE DIAGNOSIS:   Chronic rhinosinusitis S/P Bilateral functional endoscopic anterior ethmoidectomy with bilateral middle meatal antrostomy, Septoplasty, and Bilateral inferior turbinate reduction via Coblation on 11/18/2024      POSTPROCEDURE DIAGNOSIS:  SAME     ANESTHESIA:   None       Procedure Details:    After topical anesthesia and decongestion, the patient was placed in the sitting position.  An endoscope was passed along the left nasal floor to the nasopharynx.  It was then passed into the region of the middle meatus, middle turbinate, and the sphenoethmoid region. An identical procedure was performed on the right side.  The following findings were noted as stated below:    Findings: Moderate crusting and debris removed with bayonet forceps and suction debridement on the left.  Propel stent was also removed.  Mild left superior caudal septal deviation residual.  No significant bleeding noted bilaterally.  Both antrostomies are patent and no evidence of recidivistic polyposis appreciated.    Condition:  Stable.  Patient tolerated procedure well.    Complications:  None

## 2024-12-03 NOTE — PATIENT INSTRUCTIONS
Start nasal sprays tomorrow. Continue mupirocin for 1 week. May gently blow nose starting tomorrow. Will see patient back in 3 months.

## 2024-12-19 RX ORDER — AMOXICILLIN 500 MG/1
500 CAPSULE ORAL 3 TIMES DAILY
Qty: 30 CAPSULE | Refills: 0 | Status: SHIPPED | OUTPATIENT
Start: 2024-12-19 | End: 2024-12-29

## 2025-03-11 ENCOUNTER — OFFICE VISIT (OUTPATIENT)
Dept: OTOLARYNGOLOGY | Facility: CLINIC | Age: 30
End: 2025-03-11
Payer: COMMERCIAL

## 2025-03-11 VITALS — HEIGHT: 71 IN | BODY MASS INDEX: 30.8 KG/M2 | WEIGHT: 220 LBS | TEMPERATURE: 98 F

## 2025-03-11 DIAGNOSIS — J34.2 NASAL SEPTAL DEVIATION: ICD-10-CM

## 2025-03-11 DIAGNOSIS — J32.9 CHRONIC RHINOSINUSITIS: ICD-10-CM

## 2025-03-11 DIAGNOSIS — Z98.890 S/P FESS (FUNCTIONAL ENDOSCOPIC SINUS SURGERY): Primary | ICD-10-CM

## 2025-03-11 NOTE — PROGRESS NOTES
Procedure Note    Chavo Potts    DATE OF PROCEDURE: 3/11/2025    PROCEDURE:   Bilateral Rigid Nasal Endoscopy with debridement     PREPROCEDURE DIAGNOSIS:   Chronic rhinosinusitis S/P FESS and ITR       POSTPROCEDURE DIAGNOSIS:  SAME     ANESTHESIA:   None       Procedure Details:    After topical anesthesia and decongestion, the patient was placed in the sitting position.  An endoscope was passed along the left nasal floor to the nasopharynx.  It was then passed into the region of the middle meatus, middle turbinate, and the sphenoethmoid region. An identical procedure was performed on the right side.  The following findings were noted as stated below:    Findings: Moderate severe crusting on the right in [place] area.  Large amount of crusted debris in the posterior aspect of the inferior turbinate removed with combination of suction debridement and cups forceps.  No bleeding was encountered.  Relatively moderate-sized antral choanal polyp was noted emanating from the antrostomy which is otherwise still patent.  This polyp is not obstructive.  There is no surrounding inflammation.  The choana is patent.  On the right antrostomy is patent there is no abnormalities.    Condition:  Stable.  Patient tolerated procedure well.    Complications:  None

## 2025-03-11 NOTE — PROGRESS NOTES
YOB: 1995  Location: [facility]  Location Address: [address] [place] [address], [facility], [address] [place] [address]  Location Phone: [phone]    Chief Complaint   Patient presents with    Chronic rhinosinusitis       History of Present Illness  Chavo Potts is a 29 y.o. male.  Chavo Potts is here for follow up of ENT complaints. The patient is status post bilateral endoscopic functional sinus surgery with septoplasty and resection of inferior turbinates 2024  Patient has been using nasal sprays daily as directed  He complains of a foul odor from the left side of his nose that is intermittent in nature and he states he has thick crusted debris from the left side   He completed antibiotics in December      Tissue Pathology Exam (2024 09:54)   History reviewed. No pertinent past medical history.    Past Surgical History:   Procedure Laterality Date    BONE GRAFT      ENDOSCOPIC FUNCTIONAL SINUS SURGERY (FESS) Bilateral 2024    Procedure: BILATERAL ENDOSCOPIC FUNCTIONAL SINUS SURGERY W TRACKING, SINUPLASTY, SEPTOPLASTY, RESECT INFERIOR TURBINATES VIA COBLATION;  Surgeon: Nathanael Duran MD;  Location: [facility];  Service: ENT;  Laterality: Bilateral;    EYE SURGERY  May 2023    Lasik    KNEE SURGERY Left     SINUS SURGERY         Outpatient Medications Marked as Taking for the 3/11/25 encounter (Office Visit) with Nathanael Duran MD   Medication Sig Dispense Refill    azelastine (ASTELIN) 0.1 % nasal spray 2 sprays into the nostril(s) as directed by provider 2 (Two) Times a Day. Use in each nostril as directed 30 mL 11    fluticasone (FLONASE) 50 MCG/ACT nasal spray 2 sprays into the nostril(s) as directed by provider Daily. 16 g 11    montelukast (SINGULAIR) 10 MG tablet Take 1 tablet by mouth.      mupirocin (BACTROBAN) 2 % nasal ointment Apply to the nose twice daily 3 g 0       Cefprozil    Family History   Problem Relation Age of Onset    Cancer Father        Social History      Socioeconomic History    Marital status:    Tobacco Use    Smoking status: Never    Smokeless tobacco: Never   Vaping Use    Vaping status: Never Used   Substance and Sexual Activity    Alcohol use: Never    Drug use: Never    Sexual activity: Defer       Review of Systems   Constitutional: Negative.    HENT:  Positive for congestion.        Vitals:    03/11/25 0945   Temp: 98 °F (36.7 °C)       Body mass index is 30.73 kg/m².    Objective     Physical Exam  Vitals reviewed.   Constitutional:       Appearance: Normal appearance.   HENT:      Head: Normocephalic.      Right Ear: Tympanic membrane, ear canal and external ear normal.      Left Ear: Tympanic membrane, ear canal and external ear normal.      Nose: Congestion present.      Comments: Significant inflammation noted with septal crusting and mucoid debris     Neurological:      Mental Status: He is alert.     Dr. Duran has examined and assessed the patient and agrees with current treatment plan        Assessment & Plan   Diagnoses and all orders for this visit:    1. S/P FESS (functional endoscopic sinus surgery) (Primary)    2. Chronic rhinosinusitis    3. Nasal septal deviation    Other orders  -     mupirocin (BACTROBAN) 2 % nasal ointment; Apply to the nose twice daily  Dispense: 3 g; Refill: 0      * Surgery not found *  No orders of the defined types were placed in this encounter.    Return in about 3 weeks (around 4/1/2025) for Recheck.     Continue nasal sprays as directed  Saline spray 2 times per day   Mupirocin x 2 - 3 weeks     Patient Instructions   For the best response, use your nasal sprays every day without skipping doses. It may take several weeks before the full effect is acheived.

## 2025-03-27 DIAGNOSIS — J30.9 CHRONIC ALLERGIC RHINITIS: ICD-10-CM

## 2025-03-28 RX ORDER — MONTELUKAST SODIUM 10 MG/1
10 TABLET ORAL DAILY
Qty: 30 TABLET | Refills: 3 | Status: SHIPPED | OUTPATIENT
Start: 2025-03-28

## 2025-04-28 ENCOUNTER — OFFICE VISIT (OUTPATIENT)
Dept: PRIMARY CARE CLINIC | Age: 30
End: 2025-04-28
Payer: COMMERCIAL

## 2025-04-28 VITALS
RESPIRATION RATE: 18 BRPM | HEART RATE: 88 BPM | HEIGHT: 72 IN | WEIGHT: 232 LBS | TEMPERATURE: 98 F | DIASTOLIC BLOOD PRESSURE: 84 MMHG | SYSTOLIC BLOOD PRESSURE: 130 MMHG | OXYGEN SATURATION: 98 % | BODY MASS INDEX: 31.42 KG/M2

## 2025-04-28 DIAGNOSIS — R50.9 FEVER, UNSPECIFIED FEVER CAUSE: ICD-10-CM

## 2025-04-28 DIAGNOSIS — R05.1 ACUTE COUGH: Primary | ICD-10-CM

## 2025-04-28 LAB
INFLUENZA A ANTIBODY: NEGATIVE
INFLUENZA B ANTIBODY: NEGATIVE
S PYO AG THROAT QL: NORMAL

## 2025-04-28 PROCEDURE — 99213 OFFICE O/P EST LOW 20 MIN: CPT | Performed by: NURSE PRACTITIONER

## 2025-04-28 PROCEDURE — 87804 INFLUENZA ASSAY W/OPTIC: CPT | Performed by: NURSE PRACTITIONER

## 2025-04-28 PROCEDURE — 87880 STREP A ASSAY W/OPTIC: CPT | Performed by: NURSE PRACTITIONER

## 2025-04-28 RX ORDER — DEXTROMETHORPHAN HYDROBROMIDE AND PROMETHAZINE HYDROCHLORIDE 15; 6.25 MG/5ML; MG/5ML
SYRUP ORAL
Qty: 120 ML | Refills: 0 | Status: SHIPPED | OUTPATIENT
Start: 2025-04-28 | End: 2025-04-28

## 2025-04-28 RX ORDER — DEXTROMETHORPHAN HYDROBROMIDE AND PROMETHAZINE HYDROCHLORIDE 15; 6.25 MG/5ML; MG/5ML
SYRUP ORAL
Qty: 120 ML | Refills: 0 | Status: SHIPPED | OUTPATIENT
Start: 2025-04-28 | End: 2025-05-05

## 2025-04-28 SDOH — ECONOMIC STABILITY: FOOD INSECURITY: WITHIN THE PAST 12 MONTHS, THE FOOD YOU BOUGHT JUST DIDN'T LAST AND YOU DIDN'T HAVE MONEY TO GET MORE.: NEVER TRUE

## 2025-04-28 SDOH — ECONOMIC STABILITY: FOOD INSECURITY: WITHIN THE PAST 12 MONTHS, YOU WORRIED THAT YOUR FOOD WOULD RUN OUT BEFORE YOU GOT MONEY TO BUY MORE.: NEVER TRUE

## 2025-04-28 ASSESSMENT — ENCOUNTER SYMPTOMS
NAUSEA: 0
VOMITING: 0
EYES NEGATIVE: 1
COUGH: 1
SORE THROAT: 0
ALLERGIC/IMMUNOLOGIC NEGATIVE: 1
DIARRHEA: 1

## 2025-04-28 ASSESSMENT — PATIENT HEALTH QUESTIONNAIRE - PHQ9
SUM OF ALL RESPONSES TO PHQ QUESTIONS 1-9: 0
1. LITTLE INTEREST OR PLEASURE IN DOING THINGS: NOT AT ALL
SUM OF ALL RESPONSES TO PHQ QUESTIONS 1-9: 0
2. FEELING DOWN, DEPRESSED OR HOPELESS: NOT AT ALL
SUM OF ALL RESPONSES TO PHQ QUESTIONS 1-9: 0
SUM OF ALL RESPONSES TO PHQ QUESTIONS 1-9: 0

## 2025-04-28 NOTE — PROGRESS NOTES
are caused by viruses. These conditions usually run their course in 7-14 days.  Antibiotics do not help fight viral infections and are not needed at this time. Viral syndromes are treated with symptomatic support. You may take tylenol or ibuprofen for fever or aches and pains. Stay hydrated by taking sips of water or non caffeinated, noncarbonated, and nonalcoholic beverages throughout the day. For sore throat, you may gargle with warm salt water, use lozenges or sprays. Hot tea with honey may also be soothing to the throat. Using a daily antihistamine such as Claritin, Zyrtec or Allegra can help with upper respiratory symptoms. Benadryl can be sedating but is helpful at drying secretions and may be taken at night. For earaches, microwave a wet washcloth for 2 mins then using tongs (do not touch as it may scald you ) place cloth in ceramic cup and hold up to ear. The moist heat can be soothing to the ear.     Call if you have a fever greater than 102 F or if symptoms do not improve. Your provider may also send you in prescription medications depending on your symptoms and their severity. Take all medications as directed on package unless specifically told otherwise.     Currently the CDC does not require a mandatory quarantine for COVID 19 infection.  For a COVID-19 , influenza or other significant viral infection the current recommendations indicate that if you have had a fever that you are to be at least 24 hours fever free without the use of fever reducing medications such as Tylenol or ibuprofen before returning to work or school.     Beneficial ways to avoid spreading viral infections is to :  stay at home while acutely ill   wash hands frequently   wear a mask when you have to be in a public place while acutely ill.  Cover your mouth and nose for sneezing or coughing.          Electronically signed by BRUCE Dennison NP on 5/1/2025 at 7:31 AM    EMR Dragon/transcription disclaimer:  Much of thisencounter

## 2025-04-28 NOTE — PATIENT INSTRUCTIONS
Viral syndrome   Generally, I recommend Flonase one spray to each side twice a day for 14 days along with a potent antihistamine such as Allegra D, Zyrtec D or Claritin D for 14 days.   If you have Blood pressure problems you may not be able to tolerate the D version.  People with high blood pressure may use Coricidin HBP or Benadryl for sinus   symptoms.   Other OTC nasal sprays such as saline spray, Vicks or Naso jose can also be helpful.      Oscillococcinum may be helpful for flu or flu-like symptoms.     Supplement with Vit D3 5000 units daily, Vit C 1000 mg daily and Zinc 50 mg daily.     Many illnesses are caused by viruses. These conditions usually run their course in 7-14 days.  Antibiotics do not help fight viral infections and are not needed at this time. Viral syndromes are treated with symptomatic support. You may take tylenol or ibuprofen for fever or aches and pains. Stay hydrated by taking sips of water or non caffeinated, noncarbonated, and nonalcoholic beverages throughout the day. For sore throat, you may gargle with warm salt water, use lozenges or sprays. Hot tea with honey may also be soothing to the throat. Using a daily antihistamine such as Claritin, Zyrtec or Allegra can help with upper respiratory symptoms. Benadryl can be sedating but is helpful at drying secretions and may be taken at night. For earaches, microwave a wet washcloth for 2 mins then using tongs (do not touch as it may scald you ) place cloth in ceramic cup and hold up to ear. The moist heat can be soothing to the ear.     Call if you have a fever greater than 102 F or if symptoms do not improve. Your provider may also send you in prescription medications depending on your symptoms and their severity. Take all medications as directed on package unless specifically told otherwise.     Currently the CDC does not require a mandatory quarantine for COVID 19 infection.  For a COVID-19 , influenza or other significant viral

## 2025-04-29 ENCOUNTER — RESULTS FOLLOW-UP (OUTPATIENT)
Dept: PRIMARY CARE CLINIC | Age: 30
End: 2025-04-29

## 2025-05-07 RX ORDER — DOXYCYCLINE HYCLATE 100 MG
100 TABLET ORAL 2 TIMES DAILY
Qty: 14 TABLET | Refills: 0 | Status: SHIPPED | OUTPATIENT
Start: 2025-05-07 | End: 2025-05-14

## (undated) DEVICE — NDL HYPO PRECISIONGLIDE/REG 18G 11/2 PNK

## (undated) DEVICE — TRAP FLD MINIVAC MEGADYNE 100ML

## (undated) DEVICE — SPONGE,NEURO,0.5"X3",XR,STRL,LF,10/PK: Brand: MEDLINE

## (undated) DEVICE — SUT GUT CHRM 4/0 P3 18IN 1654G

## (undated) DEVICE — TUBING 1895522 5PK STRAIGHTSHOT TO XPS: Brand: STRAIGHTSHOT®

## (undated) DEVICE — PK ENT HD AND NK 30

## (undated) DEVICE — KIT,ANTI FOG,W/SPONGE & FLUID,SOFT PACK: Brand: MEDLINE

## (undated) DEVICE — POSITIONER,HEAD,MULTIRING,36CS: Brand: MEDLINE

## (undated) DEVICE — ELECTRD BLD EZ CLN MOD XLNG 2.75IN

## (undated) DEVICE — 4-PORT MANIFOLD: Brand: NEPTUNE 2

## (undated) DEVICE — COLLECTION SOCK, GENERAL: Brand: DEROYAL

## (undated) DEVICE — BLADE 1884012EM RAD12 4MM M4 ROTATE ROHS: Brand: FUSION®

## (undated) DEVICE — PATIENT TRACKER 9734887XOM NON-INVASIVE

## (undated) DEVICE — SINU FOAM: Brand: SINU-FOAM

## (undated) DEVICE — WIPE INST 3X3IN 2MM BX/20

## (undated) DEVICE — SUT GUT PLN 4/0 P3 18IN 1644G

## (undated) DEVICE — INSTR TRACKER 9733533 EM ENT

## (undated) DEVICE — TUBING, SUCTION, 1/4" X 12', STRAIGHT: Brand: MEDLINE

## (undated) DEVICE — GLV SURG BIOGEL M LTX PF 7 1/2